# Patient Record
Sex: FEMALE | Race: WHITE | NOT HISPANIC OR LATINO | Employment: OTHER | URBAN - METROPOLITAN AREA
[De-identification: names, ages, dates, MRNs, and addresses within clinical notes are randomized per-mention and may not be internally consistent; named-entity substitution may affect disease eponyms.]

---

## 2017-09-08 ENCOUNTER — HOSPITAL ENCOUNTER (OUTPATIENT)
Dept: LAB | Facility: MEDICAL CENTER | Age: 62
End: 2017-09-08
Attending: PHYSICIAN ASSISTANT
Payer: COMMERCIAL

## 2017-09-08 PROCEDURE — 88175 CYTOPATH C/V AUTO FLUID REDO: CPT

## 2017-09-08 PROCEDURE — 87624 HPV HI-RISK TYP POOLED RSLT: CPT

## 2017-09-12 LAB
CYTOLOGY REG CYTOL: NORMAL
HPV HR 12 DNA CVX QL NAA+PROBE: NEGATIVE
HPV16 DNA SPEC QL NAA+PROBE: NEGATIVE
HPV18 DNA SPEC QL NAA+PROBE: NEGATIVE
SPECIMEN SOURCE: NORMAL

## 2017-10-06 ENCOUNTER — HOSPITAL ENCOUNTER (OUTPATIENT)
Dept: RADIOLOGY | Facility: MEDICAL CENTER | Age: 62
End: 2017-10-06

## 2017-10-12 ENCOUNTER — HOSPITAL ENCOUNTER (OUTPATIENT)
Dept: RADIOLOGY | Facility: MEDICAL CENTER | Age: 62
End: 2017-10-12
Attending: FAMILY MEDICINE
Payer: COMMERCIAL

## 2017-10-12 DIAGNOSIS — Z12.31 VISIT FOR SCREENING MAMMOGRAM: ICD-10-CM

## 2017-10-12 DIAGNOSIS — M81.0 SENILE OSTEOPOROSIS: ICD-10-CM

## 2017-10-12 PROCEDURE — 77080 DXA BONE DENSITY AXIAL: CPT

## 2017-10-12 PROCEDURE — G0202 SCR MAMMO BI INCL CAD: HCPCS

## 2017-12-04 ENCOUNTER — HOSPITAL ENCOUNTER (OUTPATIENT)
Dept: LAB | Facility: MEDICAL CENTER | Age: 62
End: 2017-12-04
Attending: SPECIALIST
Payer: COMMERCIAL

## 2017-12-04 PROCEDURE — 87086 URINE CULTURE/COLONY COUNT: CPT

## 2017-12-07 LAB
BACTERIA UR CULT: NORMAL
SIGNIFICANT IND 70042: NORMAL
SITE SITE: NORMAL
SOURCE SOURCE: NORMAL

## 2018-02-16 ENCOUNTER — HOSPITAL ENCOUNTER (OUTPATIENT)
Dept: LAB | Facility: MEDICAL CENTER | Age: 63
End: 2018-02-16
Attending: INTERNAL MEDICINE
Payer: COMMERCIAL

## 2018-02-16 LAB
ALBUMIN SERPL BCP-MCNC: 4.2 G/DL (ref 3.2–4.9)
ALBUMIN/GLOB SERPL: 1.6 G/DL
ALP SERPL-CCNC: 58 U/L (ref 30–99)
ALT SERPL-CCNC: 14 U/L (ref 2–50)
ANION GAP SERPL CALC-SCNC: 5 MMOL/L (ref 0–11.9)
AST SERPL-CCNC: 19 U/L (ref 12–45)
BASOPHILS # BLD AUTO: 0.9 % (ref 0–1.8)
BASOPHILS # BLD: 0.06 K/UL (ref 0–0.12)
BILIRUB SERPL-MCNC: 0.7 MG/DL (ref 0.1–1.5)
BUN SERPL-MCNC: 12 MG/DL (ref 8–22)
CALCIUM SERPL-MCNC: 8.8 MG/DL (ref 8.5–10.5)
CHLORIDE SERPL-SCNC: 110 MMOL/L (ref 96–112)
CO2 SERPL-SCNC: 23 MMOL/L (ref 20–33)
CREAT SERPL-MCNC: 0.8 MG/DL (ref 0.5–1.4)
EOSINOPHIL # BLD AUTO: 0.37 K/UL (ref 0–0.51)
EOSINOPHIL NFR BLD: 5.4 % (ref 0–6.9)
ERYTHROCYTE [DISTWIDTH] IN BLOOD BY AUTOMATED COUNT: 51.9 FL (ref 35.9–50)
GLOBULIN SER CALC-MCNC: 2.7 G/DL (ref 1.9–3.5)
GLUCOSE SERPL-MCNC: 86 MG/DL (ref 65–99)
HCT VFR BLD AUTO: 40 % (ref 37–47)
HGB BLD-MCNC: 12.3 G/DL (ref 12–16)
IMM GRANULOCYTES # BLD AUTO: 0.01 K/UL (ref 0–0.11)
IMM GRANULOCYTES NFR BLD AUTO: 0.1 % (ref 0–0.9)
LYMPHOCYTES # BLD AUTO: 1.77 K/UL (ref 1–4.8)
LYMPHOCYTES NFR BLD: 25.8 % (ref 22–41)
MCH RBC QN AUTO: 31.8 PG (ref 27–33)
MCHC RBC AUTO-ENTMCNC: 30.8 G/DL (ref 33.6–35)
MCV RBC AUTO: 103.4 FL (ref 81.4–97.8)
MONOCYTES # BLD AUTO: 0.69 K/UL (ref 0–0.85)
MONOCYTES NFR BLD AUTO: 10.1 % (ref 0–13.4)
NEUTROPHILS # BLD AUTO: 3.95 K/UL (ref 2–7.15)
NEUTROPHILS NFR BLD: 57.7 % (ref 44–72)
NRBC # BLD AUTO: 0 K/UL
NRBC BLD-RTO: 0 /100 WBC
PLATELET # BLD AUTO: 247 K/UL (ref 164–446)
PMV BLD AUTO: 10.2 FL (ref 9–12.9)
POTASSIUM SERPL-SCNC: 3.5 MMOL/L (ref 3.6–5.5)
PROT SERPL-MCNC: 6.9 G/DL (ref 6–8.2)
RBC # BLD AUTO: 3.87 M/UL (ref 4.2–5.4)
SODIUM SERPL-SCNC: 138 MMOL/L (ref 135–145)
WBC # BLD AUTO: 6.9 K/UL (ref 4.8–10.8)

## 2018-02-16 PROCEDURE — 80197 ASSAY OF TACROLIMUS: CPT

## 2018-02-16 PROCEDURE — 80053 COMPREHEN METABOLIC PANEL: CPT

## 2018-02-16 PROCEDURE — 36415 COLL VENOUS BLD VENIPUNCTURE: CPT

## 2018-02-16 PROCEDURE — 85025 COMPLETE CBC W/AUTO DIFF WBC: CPT

## 2018-02-20 LAB — TACROLIMUS BLD-MCNC: 3.4 NG/ML

## 2018-11-13 ENCOUNTER — HOSPITAL ENCOUNTER (OUTPATIENT)
Dept: LAB | Facility: MEDICAL CENTER | Age: 63
End: 2018-11-13
Attending: INTERNAL MEDICINE
Payer: COMMERCIAL

## 2018-11-13 LAB
ALBUMIN SERPL BCP-MCNC: 4.2 G/DL (ref 3.2–4.9)
ALBUMIN/GLOB SERPL: 1.6 G/DL
ALP SERPL-CCNC: 59 U/L (ref 30–99)
ALT SERPL-CCNC: 13 U/L (ref 2–50)
ANION GAP SERPL CALC-SCNC: 8 MMOL/L (ref 0–11.9)
AST SERPL-CCNC: 15 U/L (ref 12–45)
BASOPHILS # BLD AUTO: 0.6 % (ref 0–1.8)
BASOPHILS # BLD: 0.04 K/UL (ref 0–0.12)
BILIRUB SERPL-MCNC: 0.5 MG/DL (ref 0.1–1.5)
BUN SERPL-MCNC: 18 MG/DL (ref 8–22)
CALCIUM SERPL-MCNC: 9.2 MG/DL (ref 8.5–10.5)
CHLORIDE SERPL-SCNC: 111 MMOL/L (ref 96–112)
CO2 SERPL-SCNC: 22 MMOL/L (ref 20–33)
CREAT SERPL-MCNC: 0.83 MG/DL (ref 0.5–1.4)
EOSINOPHIL # BLD AUTO: 0.17 K/UL (ref 0–0.51)
EOSINOPHIL NFR BLD: 2.4 % (ref 0–6.9)
ERYTHROCYTE [DISTWIDTH] IN BLOOD BY AUTOMATED COUNT: 52.9 FL (ref 35.9–50)
GLOBULIN SER CALC-MCNC: 2.6 G/DL (ref 1.9–3.5)
GLUCOSE SERPL-MCNC: 82 MG/DL (ref 65–99)
HCT VFR BLD AUTO: 40.3 % (ref 37–47)
HGB BLD-MCNC: 13.1 G/DL (ref 12–16)
IMM GRANULOCYTES # BLD AUTO: 0.01 K/UL (ref 0–0.11)
IMM GRANULOCYTES NFR BLD AUTO: 0.1 % (ref 0–0.9)
LYMPHOCYTES # BLD AUTO: 1.5 K/UL (ref 1–4.8)
LYMPHOCYTES NFR BLD: 21.2 % (ref 22–41)
MCH RBC QN AUTO: 34.7 PG (ref 27–33)
MCHC RBC AUTO-ENTMCNC: 32.5 G/DL (ref 33.6–35)
MCV RBC AUTO: 106.6 FL (ref 81.4–97.8)
MONOCYTES # BLD AUTO: 0.69 K/UL (ref 0–0.85)
MONOCYTES NFR BLD AUTO: 9.7 % (ref 0–13.4)
NEUTROPHILS # BLD AUTO: 4.67 K/UL (ref 2–7.15)
NEUTROPHILS NFR BLD: 66 % (ref 44–72)
NRBC # BLD AUTO: 0 K/UL
NRBC BLD-RTO: 0 /100 WBC
PLATELET # BLD AUTO: 239 K/UL (ref 164–446)
PMV BLD AUTO: 10.5 FL (ref 9–12.9)
POTASSIUM SERPL-SCNC: 3.5 MMOL/L (ref 3.6–5.5)
PROT SERPL-MCNC: 6.8 G/DL (ref 6–8.2)
RBC # BLD AUTO: 3.78 M/UL (ref 4.2–5.4)
SODIUM SERPL-SCNC: 141 MMOL/L (ref 135–145)
WBC # BLD AUTO: 7.1 K/UL (ref 4.8–10.8)

## 2018-11-13 PROCEDURE — 80197 ASSAY OF TACROLIMUS: CPT

## 2018-11-13 PROCEDURE — 85025 COMPLETE CBC W/AUTO DIFF WBC: CPT

## 2018-11-13 PROCEDURE — 80053 COMPREHEN METABOLIC PANEL: CPT

## 2018-11-13 PROCEDURE — 36415 COLL VENOUS BLD VENIPUNCTURE: CPT

## 2018-11-15 LAB — TACROLIMUS BLD-MCNC: 6.4 NG/ML

## 2018-11-20 ENCOUNTER — HOSPITAL ENCOUNTER (OUTPATIENT)
Dept: LAB | Facility: MEDICAL CENTER | Age: 63
End: 2018-11-20
Attending: NURSE PRACTITIONER
Payer: COMMERCIAL

## 2018-11-20 PROCEDURE — 87624 HPV HI-RISK TYP POOLED RSLT: CPT

## 2018-11-20 PROCEDURE — 88175 CYTOPATH C/V AUTO FLUID REDO: CPT

## 2019-01-29 ENCOUNTER — HOSPITAL ENCOUNTER (OUTPATIENT)
Dept: RADIOLOGY | Facility: MEDICAL CENTER | Age: 64
End: 2019-01-29
Attending: FAMILY MEDICINE
Payer: COMMERCIAL

## 2019-01-29 DIAGNOSIS — Q07.00 ARNOLD-CHIARI SYNDROME (HCC): ICD-10-CM

## 2019-01-29 PROCEDURE — 70544 MR ANGIOGRAPHY HEAD W/O DYE: CPT

## 2019-01-29 PROCEDURE — 70551 MRI BRAIN STEM W/O DYE: CPT

## 2019-02-19 ENCOUNTER — HOSPITAL ENCOUNTER (OUTPATIENT)
Dept: RADIOLOGY | Facility: MEDICAL CENTER | Age: 64
End: 2019-02-19
Attending: SPECIALIST
Payer: COMMERCIAL

## 2019-02-19 DIAGNOSIS — Z12.31 SCREENING MAMMOGRAM, ENCOUNTER FOR: ICD-10-CM

## 2019-02-19 DIAGNOSIS — R92.30 DENSE BREAST TISSUE: ICD-10-CM

## 2019-02-19 PROCEDURE — 76641 ULTRASOUND BREAST COMPLETE: CPT

## 2019-02-19 PROCEDURE — 77063 BREAST TOMOSYNTHESIS BI: CPT

## 2019-03-01 NOTE — CONSULTS
Neuro Interventional Service Consultation      Re: Yane Mendoza     MRN: [id number]   : 1955    Yane Mendoza was initially referred to our service by Dhiraj La MD in  for elective intracranial aneurysm repair. She is a 63 y.o. female seen in clinic for evaluation and possible intervention for intracranial aneurysm recurrence. She is also under the care of Beatrice Mccarthy MD and Fred Mccain MD.     History of Present Illness:   is known to our practice for an elective repair of a right superior hypophyseal artery aneurysm in 2012. Due to insurance reasons, she was unable to follow up in our clinic and was never contacted for follow up scans after we placed orders within her network. She has a history of headaches and was found to have a Chiari I Malformation, for which she underwent surgical intervention in . Imaging showed an incidental 7 mm unruptured right internal carotid artery aneurysm and she elected surveillance of the stable aneurysm until , when she underwent an uncomplicated aneurysm coiling with Aditya Tim MD. She never had follow up and had the return of headaches outside the pattern of her usual headaches and an updated MRA shows a possible 3-4 mm recurrence at the base of the previously treated aneurysm. She is seen today for evaluation and possible repeat coiling of the recurrence. Today, she complains of chronic headaches as well as headaches in a similar pattern to before the aneurysm was treated. These more acute headaches went away after the coiling in  but have returned. She has some right eye blurriness and double vision associated with them. She has a referral to a specialist for intracranial hypertension that was treated with CSF removal in the past. Ms. Mendoza additionally has a history of a liver transplant in . She reports significant headaches after aneurysm repair in 2012 that were effectively treated with steroids. She denies a history  of bleeding with nosebleeds, melena, or hematemesis. She does not have restrictions with taking NSAIDs. She is accompanied by her  today.     She is seen today for review of imaging studies and discussion of possible embolization of aneurysm recurrence.     Past Medical History:   Diagnosis Date   • Anesthesia     ponv   • Cataracts, bilateral    • H/O liver transplant (HCC)    • Migraine headache    • Primary biliary cirrhosis (HCC)     s/p liver transplant 1999   • Right internal carotid artery aneurysm    • Snoring      Past Surgical History:   Procedure Laterality Date   • RECOVERY  6/4/2012    [facility]   • PB SUBOCCIPT DECOMP MEDULLA/SP CRD  9/4/2008    [place], [place]   • PB TRANSPLANT LIVER,ALLOTRANSPLANT  5/30/1999    [place]     Social History     Social History   • Marital status:      Spouse name: N/A   • Number of children: N/A   • Years of education: N/A     Occupational History   • Not on file.     Social History Main Topics   • Smoking status: Never Smoker   • Smokeless tobacco: Not on file   • Alcohol use No   • Drug use: No   • Sexual activity: Not on file     Other Topics Concern   • Not on file     Social History Narrative   • No narrative on file     No family history on file.    Review of Systems   HENT: Negative for nosebleeds.    Eyes: Positive for blurred vision and double vision.        Negative for icterus   Respiratory: Negative.  Negative for hemoptysis.    Cardiovascular: Negative.    Gastrointestinal: Negative for blood in stool and melena.   Genitourinary: Negative for hematuria.   Skin: Negative.    Neurological: Positive for headaches. Negative for dizziness, sensory change, speech change and focal weakness.   Endo/Heme/Allergies: Does not bruise/bleed easily.   Psychiatric/Behavioral: Negative for substance abuse.       A comprehensive 14-point review of systems was negative except as described above.      Labs:      Ref. Range 2/16/2018 10:01 11/13/2018 09:59   WBC Latest Ref Range: 4.8 - 10.8 K/uL 6.9 7.1   RBC Latest Ref Range: 4.20 - 5.40 M/uL 3.87 (L) 3.78 (L)   Hemoglobin Latest Ref Range: 12.0 - 16.0 g/dL 12.3 13.1   Hematocrit Latest Ref Range: 37.0 - 47.0 % 40.0 40.3   MCV Latest Ref Range: 81.4 - 97.8 fL 103.4 (H) 106.6 (H)   MCH Latest Ref Range: 27.0 - 33.0 pg 31.8 34.7 (H)   MCHC Latest Ref Range: 33.6 - 35.0 g/dL 30.8 (L) 32.5 (L)   RDW Latest Ref Range: 35.9 - 50.0 fL 51.9 (H) 52.9 (H)   Platelet Count Latest Ref Range: 164 - 446 K/uL 247 239   MPV Latest Ref Range: 9.0 - 12.9 fL 10.2 10.5   Neutrophils-Polys Latest Ref Range: 44.00 - 72.00 % 57.70 66.00   Neutrophils (Absolute) Latest Ref Range: 2.00 - 7.15 K/uL 3.95 4.67   Lymphocytes Latest Ref Range: 22.00 - 41.00 % 25.80 21.20 (L)   Lymphs (Absolute) Latest Ref Range: 1.00 - 4.80 K/uL 1.77 1.50   Monocytes Latest Ref Range: 0.00 - 13.40 % 10.10 9.70   Monos (Absolute) Latest Ref Range: 0.00 - 0.85 K/uL 0.69 0.69   Eosinophils Latest Ref Range: 0.00 - 6.90 % 5.40 2.40   Eos (Absolute) Latest Ref Range: 0.00 - 0.51 K/uL 0.37 0.17   Basophils Latest Ref Range: 0.00 - 1.80 % 0.90 0.60   Baso (Absolute) Latest Ref Range: 0.00 - 0.12 K/uL 0.06 0.04   Immature Granulocytes Latest Ref Range: 0.00 - 0.90 % 0.10 0.10   Immature Granulocytes (abs) Latest Ref Range: 0.00 - 0.11 K/uL 0.01 0.01   Nucleated RBC Latest Units: /100 WBC 0.00 0.00   NRBC (Absolute) Latest Units: K/uL 0.00 0.00   Sodium Latest Ref Range: 135 - 145 mmol/L 138 141   Potassium Latest Ref Range: 3.6 - 5.5 mmol/L 3.5 (L) 3.5 (L)   Chloride Latest Ref Range: 96 - 112 mmol/L 110 111   Co2 Latest Ref Range: 20 - 33 mmol/L 23 22   Anion Gap Latest Ref Range: 0.0 - 11.9  5.0 8.0   Glucose Latest Ref Range: 65 - 99 mg/dL 86 82   Bun Latest Ref Range: 8 - 22 mg/dL 12 18   Creatinine Latest Ref Range: 0.50 - 1.40 mg/dL 0.80 0.83   GFR If  Latest Ref Range: >60 mL/min/1.73 m 2  >60 >60   GFR If Non  Latest Ref Range: >60 mL/min/1.73 m 2 >60 >60   Calcium Latest Ref Range: 8.5 - 10.5 mg/dL 8.8 9.2   AST(SGOT) Latest Ref Range: 12 - 45 U/L 19 15   ALT(SGPT) Latest Ref Range: 2 - 50 U/L 14 13   Alkaline Phosphatase Latest Ref Range: 30 - 99 U/L 58 59   Total Bilirubin Latest Ref Range: 0.1 - 1.5 mg/dL 0.7 0.5   Albumin Latest Ref Range: 3.2 - 4.9 g/dL 4.2 4.2   Total Protein Latest Ref Range: 6.0 - 8.2 g/dL 6.9 6.8   Globulin Latest Ref Range: 1.9 - 3.5 g/dL 2.7 2.6   A-G Ratio Latest Units: g/dL 1.6 1.6   Tacrolimius Latest Units: ng/mL 3.4 6.4     Radiology:   MRA head on January 29, 2019 at [facility]:  1.  Normal variant dominant distal right vertebral artery. Hypoplastic left distal vertebral artery V4 segment.  2.  Coil pack identified at the site of right superior hypophyseal aneurysm coiling with 3-3.5 mm residual or recurrent aneurysm.  3.  Neuro interventional consultation for possible reevaluation or retreatment would be recommended.      MRI brain January 29, 2019 at [facility]:  1.  Concerning the given history of Chiari I malformation, there appears to be postoperative change with suboccipital decompression.  2.  Status post coiling of right superior hypophyseal aneurysm. The coil pack is not identified on the MRI but is well seen on CT from 6/5/2012.  3.  Remainder of the study is unremarkable.    Neurointerventional procedure June 4, 2012 at [facility]:  Endovascular repair of right superior hypophyseal aneurysm.      Current Outpatient Prescriptions   Medication Sig Dispense Refill   • methylPREDNISolone (MEDROL DOSPACK) 4 MG TABS Take 4 mg by mouth every day. follow package directions   Indications: Inflammation     • omeprazole (PRILOSEC) 20 MG CPDR Take 20 mg by mouth every day.     • sucralfate (CARAFATE) 1 GM TABS Take 1 g by mouth 4 times a day.     • estradiol (VIVELLE DOT) 0.05 MG/24HR PTTW Apply 1 Patch to skin as directed 2 times a week. Use one patch two times  per week.      • acetaZOLAMIDE (DIAMOX) 250 MG TABS Take 250 mg by mouth 2 times a day.     • hydrocodone/acetaminophen (NORCO)  MG TABS Take 1-2 Tabs by mouth every 6 hours as needed.     • Calcium Carbonate-Vitamin D (CALCIUM + D PO) Take  by mouth 2 Times a Day.     • Methylcellulose, Laxative, (CITRUCEL) 500 MG TABS Take  by mouth 2 Times a Day.     • mycophenolate (CELLCEPT) 250 MG CAPS Take 750 mg by mouth 2 times a day. Indications: Body's Rejection of a Transplanted Organ     • tacrolimus (PROGRAF) 1 MG CAPS Take 1 mg by mouth 2 times a day. Indications: Body's Rejection of a Transplanted Organ     • zolpidem (AMBIEN) 5 MG TABS Take 10 mg by mouth at bedtime as needed.     • progesterone (PROMETRIUM) 100 MG CAPS Take 100 mg by mouth every day.     • sumatriptan (IMITREX) 50 MG TABS Take 100 mg by mouth Once PRN.       No current facility-administered medications for this encounter.        Allergies   Allergen Reactions   • Codeine Vomiting       Physical Exam   Constitutional: She is oriented to person, place, and time and well-developed, well-nourished, and in no distress. No distress.   HENT:   Head: Normocephalic.   Eyes: Pupils are equal, round, and reactive to light. No scleral icterus.   Cardiovascular: Normal rate, regular rhythm and normal heart sounds.    Pulmonary/Chest: Effort normal. No respiratory distress. She has decreased breath sounds in the right lower field and the left lower field. She has no wheezes. She has no rales.   Abdominal: Soft. Bowel sounds are normal. She exhibits no distension.   No ascites noted   Neurological: She is alert and oriented to person, place, and time. She has normal sensation and normal strength. She is not agitated and not disoriented. She displays no weakness, no tremor, facial symmetry, normal stance and normal speech. No cranial nerve deficit. Gait normal. Coordination and gait normal.   Skin: Skin is warm and dry. No rash noted. She is not diaphoretic.  No erythema. No pallor.   Psychiatric: Mood, memory, affect and judgment normal.     Impression:        1. Unruptured right superior hypophyseal artery aneurysm, 7 mm in size, status post coil embolization, with recurrence.        2. Primary biliary cirrhosis, status post liver transplant in 1999.        3. Migraine headaches.        4. Chiari I Malformation, status post decompression in 2008.      Plan:   Aditya Tim MD has reviewed 's history and imaging studies, examined the patient, and discussed treatment options.  is a candidate for aneurysm repair. We discussed the method of the procedure at length including the possibility of the use of stents, balloons, and Flow Diverters to facilitate aneurysm repair and associated risks of those devices. We additionally discussed the procedure risks, including bleeding and infection, damage to the arteries, reaction to any medications given during the procedure, side effects of contrast, radiation exposure, in stent stenosis, coil or stent migration, mechanical failure, stroke, hemorrhage, and death. There is a chance the aneurysm may ultimately not be amenable to endovascular intervention. After embolization, there is a chance that the aneurysm could recur. We discussed alternatives of the procedure including surveillance. We have no interval imaging since 2012 and it is unknown if the recurrence is stable since that time or a newer finding. We offered a 6 month surveillance prior to intervention. The patient verbalizes understanding of the plan and elects to proceed. Ms. Mendoza is eager to proceed with intervention and we offered a diagnostic workup prior to the coiling procedure, however she wishes to minimize the number of invasive procedures and would like to move forward with aneurysm repair. Our plan will likely include placement of a flow diverter device. Written pre- and post- procedure care instructions were provided. We discussed the  need for aspirin and Plavix prior to the coiling procedure and for 6 months post procedure if a stent or flow diverter is placed. Side effects of these medications were discussed. We will contact her to schedule.       MARTA Ramsey with Aditya Tim MD  Neuro Interventional Service   [address] ([id number])  [place] [address]  [phone]

## 2019-03-04 ENCOUNTER — HOSPITAL ENCOUNTER (OUTPATIENT)
Dept: RADIOLOGY | Facility: MEDICAL CENTER | Age: 64
End: 2019-03-04
Attending: PHYSICIAN ASSISTANT

## 2019-03-04 DIAGNOSIS — I67.1 INTRACRANIAL ANEURYSM: ICD-10-CM

## 2019-03-04 ASSESSMENT — ENCOUNTER SYMPTOMS
SENSORY CHANGE: 0
HEMOPTYSIS: 0
DIZZINESS: 0
DOUBLE VISION: 1
FOCAL WEAKNESS: 0
HEADACHES: 1
BRUISES/BLEEDS EASILY: 0
CARDIOVASCULAR NEGATIVE: 1
BLURRED VISION: 1
SPEECH CHANGE: 0
RESPIRATORY NEGATIVE: 1
BLOOD IN STOOL: 0

## 2019-03-04 ASSESSMENT — LIFESTYLE VARIABLES: SUBSTANCE_ABUSE: 0

## 2019-03-08 NOTE — PROGRESS NOTES
Pt to be scheduled for aneurysm repair with plan for Pipeline Flow Diverter placement. Plavix called in to [facility]. [place]: 75 mg po daily #30 x 5 additional RF; alternatively 75 mg po daily #180 x 1 additional refill. Pt to take with daily ASA 81 mg. Instructed by scheduling staff to start meds right away and preadmit 3/13 for TEG and platelet mapping. Will adjust medications if needed for platelet inhibition prior to procedure 3/19.

## 2019-03-15 DIAGNOSIS — Z01.812 PRE-PROCEDURAL LABORATORY EXAMINATION: ICD-10-CM

## 2019-03-15 LAB
ALBUMIN SERPL BCP-MCNC: 4.5 G/DL (ref 3.2–4.9)
ALBUMIN/GLOB SERPL: 1.6 G/DL
ALP SERPL-CCNC: 55 U/L (ref 30–99)
ALT SERPL-CCNC: 16 U/L (ref 2–50)
ANION GAP SERPL CALC-SCNC: 8 MMOL/L (ref 0–11.9)
AST SERPL-CCNC: 16 U/L (ref 12–45)
BILIRUB SERPL-MCNC: 0.6 MG/DL (ref 0.1–1.5)
BUN SERPL-MCNC: 16 MG/DL (ref 8–22)
CALCIUM SERPL-MCNC: 9 MG/DL (ref 8.5–10.5)
CFT BLD TEG: 4.6 MIN (ref 5–10)
CHLORIDE SERPL-SCNC: 109 MMOL/L (ref 96–112)
CLOT ANGLE BLD TEG: 63.3 DEGREES (ref 53–72)
CLOT LYSIS 30M P MA LENFR BLD TEG: 0.3 % (ref 0–8)
CO2 SERPL-SCNC: 23 MMOL/L (ref 20–33)
CREAT SERPL-MCNC: 0.91 MG/DL (ref 0.5–1.4)
CT.EXTRINSIC BLD ROTEM: 2 MIN (ref 1–3)
ERYTHROCYTE [DISTWIDTH] IN BLOOD BY AUTOMATED COUNT: 51.3 FL (ref 35.9–50)
GLOBULIN SER CALC-MCNC: 2.9 G/DL (ref 1.9–3.5)
GLUCOSE SERPL-MCNC: 107 MG/DL (ref 65–99)
HCT VFR BLD AUTO: 42.5 % (ref 37–47)
HGB BLD-MCNC: 13.2 G/DL (ref 12–16)
INR PPP: 1.04 (ref 0.87–1.13)
MCF BLD TEG: 60.4 MM (ref 50–70)
MCH RBC QN AUTO: 33.3 PG (ref 27–33)
MCHC RBC AUTO-ENTMCNC: 31.1 G/DL (ref 33.6–35)
MCV RBC AUTO: 107.3 FL (ref 81.4–97.8)
PA AA BLD-ACNC: 94.5 %
PA ADP BLD-ACNC: 30.4 %
PLATELET # BLD AUTO: 242 K/UL (ref 164–446)
PMV BLD AUTO: 10.3 FL (ref 9–12.9)
POTASSIUM SERPL-SCNC: 3.7 MMOL/L (ref 3.6–5.5)
PROT SERPL-MCNC: 7.4 G/DL (ref 6–8.2)
PROTHROMBIN TIME: 13.8 SEC (ref 12–14.6)
RBC # BLD AUTO: 3.96 M/UL (ref 4.2–5.4)
SODIUM SERPL-SCNC: 140 MMOL/L (ref 135–145)
TEG ALGORITHM TGALG: ABNORMAL
WBC # BLD AUTO: 6.7 K/UL (ref 4.8–10.8)

## 2019-03-15 PROCEDURE — 85027 COMPLETE CBC AUTOMATED: CPT

## 2019-03-15 PROCEDURE — 36415 COLL VENOUS BLD VENIPUNCTURE: CPT

## 2019-03-15 PROCEDURE — 85384 FIBRINOGEN ACTIVITY: CPT

## 2019-03-15 PROCEDURE — 85347 COAGULATION TIME ACTIVATED: CPT

## 2019-03-15 PROCEDURE — 80053 COMPREHEN METABOLIC PANEL: CPT

## 2019-03-15 PROCEDURE — 85576 BLOOD PLATELET AGGREGATION: CPT | Mod: 91

## 2019-03-15 PROCEDURE — 85610 PROTHROMBIN TIME: CPT

## 2019-03-15 RX ORDER — ACYCLOVIR 800 MG/1
800 TABLET ORAL 4 TIMES DAILY
COMMUNITY

## 2019-03-15 RX ORDER — ZOLPIDEM TARTRATE 10 MG/1
15-20 TABLET ORAL NIGHTLY PRN
COMMUNITY

## 2019-03-15 RX ORDER — CLOPIDOGREL BISULFATE 75 MG/1
75 TABLET ORAL DAILY
COMMUNITY

## 2019-03-15 RX ORDER — PANTOPRAZOLE SODIUM 40 MG/1
40 TABLET, DELAYED RELEASE ORAL DAILY
COMMUNITY

## 2019-03-19 ENCOUNTER — HOSPITAL ENCOUNTER (INPATIENT)
Facility: MEDICAL CENTER | Age: 64
LOS: 1 days | DRG: 026 | End: 2019-03-20
Attending: RADIOLOGY | Admitting: RADIOLOGY
Payer: COMMERCIAL

## 2019-03-19 ENCOUNTER — ANESTHESIA EVENT (OUTPATIENT)
Dept: RADIOLOGY | Facility: MEDICAL CENTER | Age: 64
DRG: 026 | End: 2019-03-19
Payer: COMMERCIAL

## 2019-03-19 ENCOUNTER — ANESTHESIA (OUTPATIENT)
Dept: RADIOLOGY | Facility: MEDICAL CENTER | Age: 64
DRG: 026 | End: 2019-03-19
Payer: COMMERCIAL

## 2019-03-19 ENCOUNTER — APPOINTMENT (OUTPATIENT)
Dept: RADIOLOGY | Facility: MEDICAL CENTER | Age: 64
DRG: 026 | End: 2019-03-19
Attending: RADIOLOGY
Payer: COMMERCIAL

## 2019-03-19 DIAGNOSIS — I67.1 INTRACRANIAL ANEURYSM: ICD-10-CM

## 2019-03-19 PROBLEM — R11.2 PONV (POSTOPERATIVE NAUSEA AND VOMITING): Status: ACTIVE | Noted: 2019-03-19

## 2019-03-19 PROBLEM — K74.3 PRIMARY BILIARY CIRRHOSIS (HCC): Status: ACTIVE | Noted: 2019-03-19

## 2019-03-19 PROBLEM — Z98.890 PONV (POSTOPERATIVE NAUSEA AND VOMITING): Status: ACTIVE | Noted: 2019-03-19

## 2019-03-19 PROBLEM — Z94.4 H/O LIVER TRANSPLANT (HCC): Status: ACTIVE | Noted: 2019-03-19

## 2019-03-19 PROCEDURE — 99223 1ST HOSP IP/OBS HIGH 75: CPT | Performed by: HOSPITALIST

## 2019-03-19 PROCEDURE — 700102 HCHG RX REV CODE 250 W/ 637 OVERRIDE(OP): Performed by: RADIOLOGY

## 2019-03-19 PROCEDURE — A9270 NON-COVERED ITEM OR SERVICE: HCPCS

## 2019-03-19 PROCEDURE — 700111 HCHG RX REV CODE 636 W/ 250 OVERRIDE (IP): Performed by: ANESTHESIOLOGY

## 2019-03-19 PROCEDURE — 700105 HCHG RX REV CODE 258: Performed by: ANESTHESIOLOGY

## 2019-03-19 PROCEDURE — 700101 HCHG RX REV CODE 250: Performed by: ANESTHESIOLOGY

## 2019-03-19 PROCEDURE — A9270 NON-COVERED ITEM OR SERVICE: HCPCS | Performed by: RADIOLOGY

## 2019-03-19 PROCEDURE — 700102 HCHG RX REV CODE 250 W/ 637 OVERRIDE(OP)

## 2019-03-19 PROCEDURE — 770022 HCHG ROOM/CARE - ICU (200)

## 2019-03-19 PROCEDURE — 03VG3DZ RESTRICTION OF INTRACRANIAL ARTERY WITH INTRALUMINAL DEVICE, PERCUTANEOUS APPROACH: ICD-10-PCS | Performed by: RADIOLOGY

## 2019-03-19 PROCEDURE — A9270 NON-COVERED ITEM OR SERVICE: HCPCS | Performed by: HOSPITALIST

## 2019-03-19 PROCEDURE — 700102 HCHG RX REV CODE 250 W/ 637 OVERRIDE(OP): Performed by: HOSPITALIST

## 2019-03-19 PROCEDURE — 700101 HCHG RX REV CODE 250

## 2019-03-19 PROCEDURE — 700105 HCHG RX REV CODE 258: Performed by: HOSPITALIST

## 2019-03-19 PROCEDURE — 160002 HCHG RECOVERY MINUTES (STAT)

## 2019-03-19 PROCEDURE — 01926 ANES IVNTL RAD ICR ICAR/AORT: CPT

## 2019-03-19 PROCEDURE — 700102 HCHG RX REV CODE 250 W/ 637 OVERRIDE(OP): Performed by: ANESTHESIOLOGY

## 2019-03-19 PROCEDURE — 61624 TCAT PERM OCCLS/EMBOLJ CNS: CPT

## 2019-03-19 PROCEDURE — 700111 HCHG RX REV CODE 636 W/ 250 OVERRIDE (IP)

## 2019-03-19 PROCEDURE — 700117 HCHG RX CONTRAST REV CODE 255: Performed by: RADIOLOGY

## 2019-03-19 PROCEDURE — A9270 NON-COVERED ITEM OR SERVICE: HCPCS | Performed by: ANESTHESIOLOGY

## 2019-03-19 PROCEDURE — B3131ZZ FLUOROSCOPY OF RIGHT COMMON CAROTID ARTERY USING LOW OSMOLAR CONTRAST: ICD-10-PCS | Performed by: RADIOLOGY

## 2019-03-19 PROCEDURE — 700111 HCHG RX REV CODE 636 W/ 250 OVERRIDE (IP): Performed by: HOSPITALIST

## 2019-03-19 RX ORDER — HEPARIN SODIUM,PORCINE 1000/ML
VIAL (ML) INJECTION
Status: COMPLETED
Start: 2019-03-19 | End: 2019-03-19

## 2019-03-19 RX ORDER — SODIUM CHLORIDE 9 MG/ML
INJECTION, SOLUTION INTRAVENOUS CONTINUOUS
Status: DISCONTINUED | OUTPATIENT
Start: 2019-03-19 | End: 2019-03-20 | Stop reason: HOSPADM

## 2019-03-19 RX ORDER — BISACODYL 10 MG
10 SUPPOSITORY, RECTAL RECTAL
Status: DISCONTINUED | OUTPATIENT
Start: 2019-03-19 | End: 2019-03-20 | Stop reason: HOSPADM

## 2019-03-19 RX ORDER — TACROLIMUS 1 MG/1
1 CAPSULE ORAL 2 TIMES DAILY
Status: DISCONTINUED | OUTPATIENT
Start: 2019-03-19 | End: 2019-03-20 | Stop reason: HOSPADM

## 2019-03-19 RX ORDER — HYDROCODONE BITARTRATE AND ACETAMINOPHEN 10; 325 MG/1; MG/1
0.5 TABLET ORAL EVERY 6 HOURS PRN
Status: DISCONTINUED | OUTPATIENT
Start: 2019-03-19 | End: 2019-03-20

## 2019-03-19 RX ORDER — AMOXICILLIN 250 MG
2 CAPSULE ORAL 2 TIMES DAILY
Status: DISCONTINUED | OUTPATIENT
Start: 2019-03-19 | End: 2019-03-20 | Stop reason: HOSPADM

## 2019-03-19 RX ORDER — ACYCLOVIR 800 MG/1
800 TABLET ORAL 4 TIMES DAILY
Status: DISCONTINUED | OUTPATIENT
Start: 2019-03-19 | End: 2019-03-20 | Stop reason: HOSPADM

## 2019-03-19 RX ORDER — LIDOCAINE HYDROCHLORIDE 10 MG/ML
INJECTION, SOLUTION INFILTRATION; PERINEURAL
Status: COMPLETED
Start: 2019-03-19 | End: 2019-03-19

## 2019-03-19 RX ORDER — ACETAMINOPHEN 325 MG/1
650 TABLET ORAL EVERY 6 HOURS PRN
Status: DISCONTINUED | OUTPATIENT
Start: 2019-03-19 | End: 2019-03-20 | Stop reason: HOSPADM

## 2019-03-19 RX ORDER — MIDAZOLAM HYDROCHLORIDE 1 MG/ML
INJECTION INTRAMUSCULAR; INTRAVENOUS
Status: COMPLETED
Start: 2019-03-19 | End: 2019-03-19

## 2019-03-19 RX ORDER — ACETAZOLAMIDE 250 MG/1
250 TABLET ORAL 2 TIMES DAILY
Status: DISCONTINUED | OUTPATIENT
Start: 2019-03-19 | End: 2019-03-20 | Stop reason: HOSPADM

## 2019-03-19 RX ORDER — SUCRALFATE 1 G/1
1 TABLET ORAL 4 TIMES DAILY
Status: DISCONTINUED | OUTPATIENT
Start: 2019-03-19 | End: 2019-03-20 | Stop reason: HOSPADM

## 2019-03-19 RX ORDER — ZOLPIDEM TARTRATE 5 MG/1
5 TABLET ORAL NIGHTLY PRN
Status: DISCONTINUED | OUTPATIENT
Start: 2019-03-19 | End: 2019-03-20 | Stop reason: HOSPADM

## 2019-03-19 RX ORDER — ONDANSETRON 2 MG/ML
4 INJECTION INTRAMUSCULAR; INTRAVENOUS
Status: DISCONTINUED | OUTPATIENT
Start: 2019-03-19 | End: 2019-03-19 | Stop reason: HOSPADM

## 2019-03-19 RX ORDER — OMEPRAZOLE 20 MG/1
20 CAPSULE, DELAYED RELEASE ORAL DAILY
Status: DISCONTINUED | OUTPATIENT
Start: 2019-03-20 | End: 2019-03-20 | Stop reason: HOSPADM

## 2019-03-19 RX ORDER — DIPHENHYDRAMINE HYDROCHLORIDE 50 MG/ML
12.5 INJECTION INTRAMUSCULAR; INTRAVENOUS
Status: DISCONTINUED | OUTPATIENT
Start: 2019-03-19 | End: 2019-03-19 | Stop reason: HOSPADM

## 2019-03-19 RX ORDER — HALOPERIDOL 5 MG/ML
1 INJECTION INTRAMUSCULAR
Status: DISCONTINUED | OUTPATIENT
Start: 2019-03-19 | End: 2019-03-19 | Stop reason: HOSPADM

## 2019-03-19 RX ORDER — SODIUM CHLORIDE, SODIUM LACTATE, POTASSIUM CHLORIDE, CALCIUM CHLORIDE 600; 310; 30; 20 MG/100ML; MG/100ML; MG/100ML; MG/100ML
INJECTION, SOLUTION INTRAVENOUS
Status: DISCONTINUED | OUTPATIENT
Start: 2019-03-19 | End: 2019-03-19 | Stop reason: SURG

## 2019-03-19 RX ORDER — LIDOCAINE HYDROCHLORIDE 40 MG/ML
SOLUTION TOPICAL
Status: DISPENSED
Start: 2019-03-19 | End: 2019-03-20

## 2019-03-19 RX ORDER — CLOPIDOGREL BISULFATE 75 MG/1
75 TABLET ORAL DAILY
Status: DISCONTINUED | OUTPATIENT
Start: 2019-03-19 | End: 2019-03-20 | Stop reason: HOSPADM

## 2019-03-19 RX ORDER — OXYCODONE HCL 5 MG/5 ML
5 SOLUTION, ORAL ORAL
Status: COMPLETED | OUTPATIENT
Start: 2019-03-19 | End: 2019-03-19

## 2019-03-19 RX ORDER — POLYETHYLENE GLYCOL 3350 17 G/17G
1 POWDER, FOR SOLUTION ORAL
Status: DISCONTINUED | OUTPATIENT
Start: 2019-03-19 | End: 2019-03-20 | Stop reason: HOSPADM

## 2019-03-19 RX ORDER — SODIUM CHLORIDE, SODIUM LACTATE, POTASSIUM CHLORIDE, CALCIUM CHLORIDE 600; 310; 30; 20 MG/100ML; MG/100ML; MG/100ML; MG/100ML
INJECTION, SOLUTION INTRAVENOUS ONCE
Status: COMPLETED | OUTPATIENT
Start: 2019-03-19 | End: 2019-03-20

## 2019-03-19 RX ORDER — DEXAMETHASONE SODIUM PHOSPHATE 4 MG/ML
INJECTION, SOLUTION INTRA-ARTICULAR; INTRALESIONAL; INTRAMUSCULAR; INTRAVENOUS; SOFT TISSUE PRN
Status: DISCONTINUED | OUTPATIENT
Start: 2019-03-19 | End: 2019-03-19 | Stop reason: SURG

## 2019-03-19 RX ORDER — ONDANSETRON 2 MG/ML
4 INJECTION INTRAMUSCULAR; INTRAVENOUS EVERY 4 HOURS PRN
Status: DISCONTINUED | OUTPATIENT
Start: 2019-03-19 | End: 2019-03-20 | Stop reason: HOSPADM

## 2019-03-19 RX ORDER — MYCOPHENOLATE MOFETIL 250 MG/1
750 CAPSULE ORAL 2 TIMES DAILY
Status: DISCONTINUED | OUTPATIENT
Start: 2019-03-19 | End: 2019-03-20 | Stop reason: HOSPADM

## 2019-03-19 RX ORDER — HEPARIN SODIUM,PORCINE 1000/ML
VIAL (ML) INJECTION PRN
Status: DISCONTINUED | OUTPATIENT
Start: 2019-03-19 | End: 2019-03-19 | Stop reason: SURG

## 2019-03-19 RX ORDER — CEFAZOLIN SODIUM 1 G/3ML
INJECTION, POWDER, FOR SOLUTION INTRAMUSCULAR; INTRAVENOUS PRN
Status: DISCONTINUED | OUTPATIENT
Start: 2019-03-19 | End: 2019-03-19 | Stop reason: SURG

## 2019-03-19 RX ORDER — SODIUM CHLORIDE, SODIUM LACTATE, POTASSIUM CHLORIDE, CALCIUM CHLORIDE 600; 310; 30; 20 MG/100ML; MG/100ML; MG/100ML; MG/100ML
INJECTION, SOLUTION INTRAVENOUS CONTINUOUS
Status: DISCONTINUED | OUTPATIENT
Start: 2019-03-19 | End: 2019-03-19 | Stop reason: HOSPADM

## 2019-03-19 RX ORDER — PROMETHAZINE HYDROCHLORIDE 12.5 MG/1
12.5-25 SUPPOSITORY RECTAL EVERY 4 HOURS PRN
Status: DISCONTINUED | OUTPATIENT
Start: 2019-03-19 | End: 2019-03-20 | Stop reason: HOSPADM

## 2019-03-19 RX ORDER — PROMETHAZINE HYDROCHLORIDE 25 MG/1
12.5-25 TABLET ORAL EVERY 4 HOURS PRN
Status: DISCONTINUED | OUTPATIENT
Start: 2019-03-19 | End: 2019-03-20 | Stop reason: HOSPADM

## 2019-03-19 RX ORDER — CLOPIDOGREL BISULFATE 75 MG/1
75 TABLET ORAL DAILY
Status: DISCONTINUED | OUTPATIENT
Start: 2019-03-20 | End: 2019-03-19

## 2019-03-19 RX ORDER — TERBINAFINE HYDROCHLORIDE 250 MG/1
1 TABLET ORAL SEE ADMIN INSTRUCTIONS
Refills: 2 | COMMUNITY
Start: 2019-02-18

## 2019-03-19 RX ORDER — PANTOPRAZOLE SODIUM 40 MG/1
40 TABLET, DELAYED RELEASE ORAL DAILY
Status: DISCONTINUED | OUTPATIENT
Start: 2019-03-20 | End: 2019-03-19

## 2019-03-19 RX ORDER — MEPERIDINE HYDROCHLORIDE 25 MG/ML
6.25 INJECTION INTRAMUSCULAR; INTRAVENOUS; SUBCUTANEOUS
Status: DISCONTINUED | OUTPATIENT
Start: 2019-03-19 | End: 2019-03-19 | Stop reason: HOSPADM

## 2019-03-19 RX ORDER — ONDANSETRON 4 MG/1
4 TABLET, ORALLY DISINTEGRATING ORAL EVERY 4 HOURS PRN
Status: DISCONTINUED | OUTPATIENT
Start: 2019-03-19 | End: 2019-03-20 | Stop reason: HOSPADM

## 2019-03-19 RX ADMIN — HYDROCODONE BITARTRATE AND ACETAMINOPHEN 0.5 TABLET: 10; 325 TABLET ORAL at 21:29

## 2019-03-19 RX ADMIN — SODIUM CHLORIDE, POTASSIUM CHLORIDE, SODIUM LACTATE AND CALCIUM CHLORIDE: 600; 310; 30; 20 INJECTION, SOLUTION INTRAVENOUS at 16:35

## 2019-03-19 RX ADMIN — EPHEDRINE SULFATE 10 MG: 50 INJECTION INTRAMUSCULAR; INTRAVENOUS; SUBCUTANEOUS at 16:05

## 2019-03-19 RX ADMIN — CLOPIDOGREL BISULFATE 75 MG: 75 TABLET ORAL at 20:18

## 2019-03-19 RX ADMIN — ZOLPIDEM TARTRATE 5 MG: 5 TABLET ORAL at 22:18

## 2019-03-19 RX ADMIN — FENTANYL CITRATE 25 MCG: 50 INJECTION, SOLUTION INTRAMUSCULAR; INTRAVENOUS at 17:37

## 2019-03-19 RX ADMIN — ROCURONIUM BROMIDE 40 MG: 10 INJECTION, SOLUTION INTRAVENOUS at 15:30

## 2019-03-19 RX ADMIN — SODIUM CHLORIDE, POTASSIUM CHLORIDE, SODIUM LACTATE AND CALCIUM CHLORIDE: 600; 310; 30; 20 INJECTION, SOLUTION INTRAVENOUS at 15:20

## 2019-03-19 RX ADMIN — TACROLIMUS 1 MG: 1 CAPSULE ORAL at 22:00

## 2019-03-19 RX ADMIN — FENTANYL CITRATE 25 MCG: 50 INJECTION, SOLUTION INTRAMUSCULAR; INTRAVENOUS at 17:47

## 2019-03-19 RX ADMIN — FENTANYL CITRATE 50 MCG: 50 INJECTION, SOLUTION INTRAMUSCULAR; INTRAVENOUS at 19:13

## 2019-03-19 RX ADMIN — SODIUM CHLORIDE: 9 INJECTION, SOLUTION INTRAVENOUS at 22:03

## 2019-03-19 RX ADMIN — Medication 15 ML: at 18:07

## 2019-03-19 RX ADMIN — SENNOSIDES AND DOCUSATE SODIUM 2 TABLET: 8.6; 5 TABLET ORAL at 22:03

## 2019-03-19 RX ADMIN — SODIUM CHLORIDE, SODIUM LACTATE, POTASSIUM CHLORIDE, CALCIUM CHLORIDE: 600; 310; 30; 20 INJECTION, SOLUTION INTRAVENOUS at 18:07

## 2019-03-19 RX ADMIN — ACYCLOVIR 800 MG: 800 TABLET ORAL at 21:56

## 2019-03-19 RX ADMIN — PROGESTERONE 100 MG: 100 CAPSULE ORAL at 21:59

## 2019-03-19 RX ADMIN — PROPOFOL 120 MG: 10 INJECTION, EMULSION INTRAVENOUS at 15:30

## 2019-03-19 RX ADMIN — FENTANYL CITRATE 50 MCG: 50 INJECTION, SOLUTION INTRAMUSCULAR; INTRAVENOUS at 17:55

## 2019-03-19 RX ADMIN — HYDROCODONE BITARTRATE AND ACETAMINOPHEN 15 ML: 2.5; 108 SOLUTION ORAL at 18:07

## 2019-03-19 RX ADMIN — LIDOCAINE HYDROCHLORIDE 0.5 ML: 10 INJECTION, SOLUTION INFILTRATION; PERINEURAL at 10:05

## 2019-03-19 RX ADMIN — SUCRALFATE 1 G: 1 TABLET ORAL at 22:00

## 2019-03-19 RX ADMIN — HEPARIN SODIUM 1000 UNITS: 1000 INJECTION, SOLUTION INTRAVENOUS; SUBCUTANEOUS at 16:15

## 2019-03-19 RX ADMIN — IOHEXOL 100 ML: 300 INJECTION, SOLUTION INTRAVENOUS at 11:37

## 2019-03-19 RX ADMIN — HEPARIN SODIUM 5000 UNITS: 1000 INJECTION, SOLUTION INTRAVENOUS; SUBCUTANEOUS at 15:53

## 2019-03-19 RX ADMIN — MIDAZOLAM HYDROCHLORIDE 2 MG: 1 INJECTION, SOLUTION INTRAMUSCULAR; INTRAVENOUS at 15:23

## 2019-03-19 RX ADMIN — ASPIRIN 81 MG: 81 TABLET, COATED ORAL at 20:18

## 2019-03-19 RX ADMIN — VITAMIN D, TAB 1000IU (100/BT) 1000 UNITS: 25 TAB at 22:03

## 2019-03-19 RX ADMIN — EPHEDRINE SULFATE 5 MG: 50 INJECTION INTRAMUSCULAR; INTRAVENOUS; SUBCUTANEOUS at 15:55

## 2019-03-19 RX ADMIN — SODIUM CHLORIDE, SODIUM LACTATE, POTASSIUM CHLORIDE, CALCIUM CHLORIDE: 600; 310; 30; 20 INJECTION, SOLUTION INTRAVENOUS at 10:05

## 2019-03-19 RX ADMIN — MYCOPHENOLATE MOFETIL 750 MG: 250 CAPSULE ORAL at 22:00

## 2019-03-19 RX ADMIN — FENTANYL CITRATE 25 MCG: 50 INJECTION, SOLUTION INTRAMUSCULAR; INTRAVENOUS at 17:21

## 2019-03-19 RX ADMIN — FENTANYL CITRATE 25 MCG: 50 INJECTION, SOLUTION INTRAMUSCULAR; INTRAVENOUS at 17:25

## 2019-03-19 RX ADMIN — ACETAZOLAMIDE 250 MG: 250 TABLET ORAL at 21:59

## 2019-03-19 RX ADMIN — DEXAMETHASONE SODIUM PHOSPHATE 8 MG: 4 INJECTION, SOLUTION INTRAMUSCULAR; INTRAVENOUS at 15:33

## 2019-03-19 RX ADMIN — LIDOCAINE HYDROCHLORIDE 40 MG: 20 INJECTION, SOLUTION INFILTRATION; PERINEURAL at 15:30

## 2019-03-19 RX ADMIN — CEFAZOLIN 1 G: 330 INJECTION, POWDER, FOR SOLUTION INTRAMUSCULAR; INTRAVENOUS at 15:30

## 2019-03-19 ASSESSMENT — ENCOUNTER SYMPTOMS
MYALGIAS: 0
HEADACHES: 1
COUGH: 0
WEAKNESS: 0
NAUSEA: 0
HEMOPTYSIS: 0
FEVER: 0
DEPRESSION: 0
PALPITATIONS: 0
NECK PAIN: 0
BLURRED VISION: 0
CHILLS: 0
DIZZINESS: 0
TREMORS: 0
TINGLING: 0
HEARTBURN: 0
ORTHOPNEA: 0
DOUBLE VISION: 0
SINUS PAIN: 0

## 2019-03-19 ASSESSMENT — COGNITIVE AND FUNCTIONAL STATUS - GENERAL
SUGGESTED CMS G CODE MODIFIER MOBILITY: CH
MOBILITY SCORE: 24
DAILY ACTIVITIY SCORE: 24
SUGGESTED CMS G CODE MODIFIER DAILY ACTIVITY: CH

## 2019-03-19 ASSESSMENT — COPD QUESTIONNAIRES
HAVE YOU SMOKED AT LEAST 100 CIGARETTES IN YOUR ENTIRE LIFE: NO/DON'T KNOW
COPD SCREENING SCORE: 2
DO YOU EVER COUGH UP ANY MUCUS OR PHLEGM?: NO/ONLY WITH OCCASIONAL COLDS OR INFECTIONS
DURING THE PAST 4 WEEKS HOW MUCH DID YOU FEEL SHORT OF BREATH: NONE/LITTLE OF THE TIME

## 2019-03-19 ASSESSMENT — LIFESTYLE VARIABLES
EVER_SMOKED: NEVER
ALCOHOL_USE: NO

## 2019-03-19 ASSESSMENT — PATIENT HEALTH QUESTIONNAIRE - PHQ9
SUM OF ALL RESPONSES TO PHQ9 QUESTIONS 1 AND 2: 0
2. FEELING DOWN, DEPRESSED, IRRITABLE, OR HOPELESS: NOT AT ALL
1. LITTLE INTEREST OR PLEASURE IN DOING THINGS: NOT AT ALL

## 2019-03-19 ASSESSMENT — PAIN SCALES - GENERAL: PAIN_LEVEL: 0

## 2019-03-19 NOTE — PROGRESS NOTES
IR Nursing Note:    Pre procedure patient was AAOx4, SHEEHAN Pedal pulses +2 right +2 left.    Patient underwent a Cerebral angiogram with right ICA flow diverter placement by Dr. Tim anesthesia by Dr. Tabares. Patient was placed in a supine position. Anesthesia induced without incident.  Right femoral artery was accessed.  One Pipelline flow diverter was placed in the right ICA.  An angio-seal was used to achieve hemostasis.   A Tegaderm and gauze dressing was placed over surgical site.   Pt transported by hemant with anesthesia and RN to [facility] pacu in a stable condition.     Pipeline Flex Embolization Device 4.25mm x 18mm Right ICA Ref # PED - 425-18 Lot # [id number]    Angio-seal 8F Right femoral artery Ref # [id number] Lot #[id number]    Patient SHEEHAN post procedure, pedal pulses 2+ right 2+ left.  Dr. Tim spoke with patients  via phone for update.

## 2019-03-19 NOTE — OR SURGEON
Immediate Post- Operative Note        PostOp Diagnosis: Recurrent right ICA and  New left ICa aneuryms      Procedure(s):Flow diveroter -Pipeline-stent palcement      Estimated Blood Loss: Less than 5 ml        Complications: None            3/19/2019     4:44 PM     Aditya Tim

## 2019-03-19 NOTE — ANESTHESIA QCDR
2019 [facility] Clinical Data Registry (for Quality Improvement)     Postoperative nausea/vomiting risk protocol (Adult = 18 yrs and Pediatric 3-17 yrs)- (430 and 463)  General inhalation anesthetic (NOT TIVA) with PONV risk factors: Yes  Provision of anti-emetic therapy with at least 2 different classes of agents: Yes   Patient DID NOT receive anti-emetic therapy and reason is documented in Medical Record:  N/A    Multimodal Pain Management- (AQI59)  Patient undergoing Elective Surgery (i.e. Outpatient, or ASC, or Prescheduled Surgery prior to Hospital Admission): No  Use of Multimodal Pain Management, two or more drugs and/or interventions, NOT including systemic opioids: N/A  Exception: Documented allergy to multiple classes of analgesics: N/A    PACU assessment of acute postoperative pain prior to Anesthesia Care End- Applies to Patients Age = 18- (ABG7)  Initial PACU pain score is which of the following: < 7/10  Patient unable to report pain score: N/A    Post-anesthetic transfer of care checklist/protocol to PACU/ICU- (426 and 427)  Upon conclusion of case, patient transferred to which of the following locations: PACU/Non-ICU  Use of transfer checklist/protocol: Yes  Exclusion: Service Performed in Patient Hospital Room (and thus did not require transfer): N/A    PACU Reintubation- (AQI31)  General anesthesia requiring endotracheal intubation (ETT) along with subsequent extubation in OR or PACU: No  Required reintubation in the PACU: N/A  Extubation was a planned trial documented in the medical record prior to removal of the original airway device: N/A    Unplanned admission to ICU related to anesthesia service up through end of PACU care- (MD51)  Unplanned admission to ICU (not initially anticipated at anesthesia start time): No

## 2019-03-19 NOTE — ANESTHESIA PREPROCEDURE EVALUATION
Relevant Problems   (+) PONV (postoperative nausea and vomiting)      Within Normal Limits   CARDIAC   ENDO   GI   NEURO   PULMONARY       Physical Exam    Airway   Mallampati: II  TM distance: >3 FB  Neck ROM: full       Cardiovascular - normal exam  Rhythm: regular  Rate: normal  (-) murmur     Dental - normal exam         Pulmonary - normal exam  Breath sounds clear to auscultation     Abdominal    Neurological - normal exam                 Anesthesia Plan    ASA 2       Plan - general       Airway plan will be ETT        Induction: intravenous    Postoperative Plan: Postoperative administration of opioids is intended.    Pertinent diagnostic labs and testing reviewed    Informed Consent:    Anesthetic plan and risks discussed with patient.    Use of blood products discussed with: patient whom consented to blood products.

## 2019-03-19 NOTE — ANESTHESIA PROCEDURE NOTES
Airway  Date/Time: 3/19/2019 3:30 PM  Performed by: CHUCK FALCON  Authorized by: CHUCK FALCON     Location:  OR  Urgency:  Elective  Indications for Airway Management:  Anesthesia  Spontaneous Ventilation: absent    Sedation Level:  Deep  Preoxygenated: Yes    Patient Position:  Sniffing  Mask Difficulty Assessment:  0 - not attempted  Final Airway Type:  Endotracheal airway  Final Endotracheal Airway:  ETT  Cuffed: Yes    Technique Used for Successful ETT Placement:  Direct laryngoscopy  Insertion Site:  Oral  Blade Type:  Zambrano  Laryngoscope Blade/Videolaryngoscope Blade Size:  2  ETT Size (mm):  7.0  Measured from:  Teeth  ETT to Teeth (cm):  20  Placement Verified by: auscultation and capnometry    Number of Attempts at Approach:  1  Number of Other Approaches Attempted:  0

## 2019-03-20 VITALS
BODY MASS INDEX: 17.72 KG/M2 | DIASTOLIC BLOOD PRESSURE: 62 MMHG | WEIGHT: 110.23 LBS | TEMPERATURE: 97.9 F | OXYGEN SATURATION: 98 % | SYSTOLIC BLOOD PRESSURE: 123 MMHG | RESPIRATION RATE: 12 BRPM | HEART RATE: 63 BPM | HEIGHT: 66 IN

## 2019-03-20 LAB
ANION GAP SERPL CALC-SCNC: 5 MMOL/L (ref 0–11.9)
BUN SERPL-MCNC: 8 MG/DL (ref 8–22)
CALCIUM SERPL-MCNC: 7.6 MG/DL (ref 8.5–10.5)
CHLORIDE SERPL-SCNC: 115 MMOL/L (ref 96–112)
CO2 SERPL-SCNC: 19 MMOL/L (ref 20–33)
CREAT SERPL-MCNC: 0.63 MG/DL (ref 0.5–1.4)
ERYTHROCYTE [DISTWIDTH] IN BLOOD BY AUTOMATED COUNT: 52 FL (ref 35.9–50)
GLUCOSE SERPL-MCNC: 101 MG/DL (ref 65–99)
HCT VFR BLD AUTO: 36.3 % (ref 37–47)
HGB BLD-MCNC: 11.3 G/DL (ref 12–16)
MAGNESIUM SERPL-MCNC: 1.6 MG/DL (ref 1.5–2.5)
MCH RBC QN AUTO: 33.4 PG (ref 27–33)
MCHC RBC AUTO-ENTMCNC: 31.1 G/DL (ref 33.6–35)
MCV RBC AUTO: 107.4 FL (ref 81.4–97.8)
PLATELET # BLD AUTO: 183 K/UL (ref 164–446)
PMV BLD AUTO: 10 FL (ref 9–12.9)
POTASSIUM SERPL-SCNC: 3.5 MMOL/L (ref 3.6–5.5)
RBC # BLD AUTO: 3.38 M/UL (ref 4.2–5.4)
SODIUM SERPL-SCNC: 139 MMOL/L (ref 135–145)
WBC # BLD AUTO: 8.2 K/UL (ref 4.8–10.8)

## 2019-03-20 PROCEDURE — 83735 ASSAY OF MAGNESIUM: CPT

## 2019-03-20 PROCEDURE — A9270 NON-COVERED ITEM OR SERVICE: HCPCS | Performed by: HOSPITALIST

## 2019-03-20 PROCEDURE — 700102 HCHG RX REV CODE 250 W/ 637 OVERRIDE(OP): Performed by: HOSPITALIST

## 2019-03-20 PROCEDURE — 85027 COMPLETE CBC AUTOMATED: CPT

## 2019-03-20 PROCEDURE — A9270 NON-COVERED ITEM OR SERVICE: HCPCS | Performed by: INTERNAL MEDICINE

## 2019-03-20 PROCEDURE — 99239 HOSP IP/OBS DSCHRG MGMT >30: CPT | Performed by: HOSPITALIST

## 2019-03-20 PROCEDURE — 700102 HCHG RX REV CODE 250 W/ 637 OVERRIDE(OP): Performed by: INTERNAL MEDICINE

## 2019-03-20 PROCEDURE — 700111 HCHG RX REV CODE 636 W/ 250 OVERRIDE (IP): Performed by: HOSPITALIST

## 2019-03-20 PROCEDURE — 80048 BASIC METABOLIC PNL TOTAL CA: CPT

## 2019-03-20 PROCEDURE — 700111 HCHG RX REV CODE 636 W/ 250 OVERRIDE (IP): Performed by: INTERNAL MEDICINE

## 2019-03-20 RX ORDER — MAGNESIUM SULFATE HEPTAHYDRATE 40 MG/ML
4 INJECTION, SOLUTION INTRAVENOUS ONCE
Status: COMPLETED | OUTPATIENT
Start: 2019-03-20 | End: 2019-03-20

## 2019-03-20 RX ORDER — HYDROCODONE BITARTRATE AND ACETAMINOPHEN 10; 325 MG/1; MG/1
0.5 TABLET ORAL EVERY 4 HOURS PRN
Status: DISCONTINUED | OUTPATIENT
Start: 2019-03-20 | End: 2019-03-20

## 2019-03-20 RX ORDER — HYDROCODONE BITARTRATE AND ACETAMINOPHEN 10; 325 MG/1; MG/1
1 TABLET ORAL EVERY 4 HOURS PRN
Status: DISCONTINUED | OUTPATIENT
Start: 2019-03-20 | End: 2019-03-20 | Stop reason: HOSPADM

## 2019-03-20 RX ORDER — SUMATRIPTAN 50 MG/1
100 TABLET, FILM COATED ORAL
Status: DISCONTINUED | OUTPATIENT
Start: 2019-03-20 | End: 2019-03-20 | Stop reason: HOSPADM

## 2019-03-20 RX ORDER — POTASSIUM CHLORIDE 20 MEQ/1
40 TABLET, EXTENDED RELEASE ORAL ONCE
Status: COMPLETED | OUTPATIENT
Start: 2019-03-20 | End: 2019-03-20

## 2019-03-20 RX ORDER — HYDROMORPHONE HYDROCHLORIDE 1 MG/ML
.5-2 INJECTION, SOLUTION INTRAMUSCULAR; INTRAVENOUS; SUBCUTANEOUS
Status: DISCONTINUED | OUTPATIENT
Start: 2019-03-20 | End: 2019-03-20 | Stop reason: HOSPADM

## 2019-03-20 RX ADMIN — POTASSIUM CHLORIDE 40 MEQ: 1500 TABLET, EXTENDED RELEASE ORAL at 09:28

## 2019-03-20 RX ADMIN — HYDROCODONE BITARTRATE AND ACETAMINOPHEN 0.5 TABLET: 10; 325 TABLET ORAL at 06:13

## 2019-03-20 RX ADMIN — OMEPRAZOLE 20 MG: 20 CAPSULE, DELAYED RELEASE ORAL at 05:33

## 2019-03-20 RX ADMIN — SUCRALFATE 1 G: 1 TABLET ORAL at 09:31

## 2019-03-20 RX ADMIN — SENNOSIDES AND DOCUSATE SODIUM 2 TABLET: 8.6; 5 TABLET ORAL at 05:34

## 2019-03-20 RX ADMIN — MAGNESIUM SULFATE IN WATER 4 G: 40 INJECTION, SOLUTION INTRAVENOUS at 09:44

## 2019-03-20 RX ADMIN — SUMATRIPTAN SUCCINATE 50 MG: 50 TABLET ORAL at 10:34

## 2019-03-20 RX ADMIN — HYDROCODONE BITARTRATE AND ACETAMINOPHEN 0.5 TABLET: 10; 325 TABLET ORAL at 01:23

## 2019-03-20 RX ADMIN — MYCOPHENOLATE MOFETIL 750 MG: 250 CAPSULE ORAL at 05:34

## 2019-03-20 RX ADMIN — TACROLIMUS 1 MG: 1 CAPSULE ORAL at 05:33

## 2019-03-20 RX ADMIN — ACYCLOVIR 800 MG: 800 TABLET ORAL at 09:31

## 2019-03-20 RX ADMIN — HYDROCODONE BITARTRATE AND ACETAMINOPHEN 0.5 TABLET: 10; 325 TABLET ORAL at 05:35

## 2019-03-20 RX ADMIN — HYDROMORPHONE HYDROCHLORIDE 0.5 MG: 1 INJECTION, SOLUTION INTRAMUSCULAR; INTRAVENOUS; SUBCUTANEOUS at 09:44

## 2019-03-20 NOTE — PROGRESS NOTES
S/p uncomplicated right  artery aneurysm repair  on 3/19/19 by Aditya Tim MD Admitted to ICU for post procedure neurologic monitoring.     Awake, oriented x 4. Face symmetric, speech fluent.    From a NeuroInterventional Service standpoint, OK to discharge to home when medically cleared by Hospitalist Service. Patient to follow up in our clinic in 2 weeks, our office to arrange appointment. Do  need to continue aspirin and Plavix from our standpoint.     Post-angioseal instructions: no lifting greater than 5 lbs and no baths/ swimming/ soaking in tub for 5 days. Shower OK. OK to change dressings to band aid as needed.

## 2019-03-20 NOTE — CARE PLAN
Problem: Safety  Goal: Will remain free from falls  Outcome: PROGRESSING AS EXPECTED  Pt oriented to room an call light within reach. Bed locked and in low position. Bed alarm activated. Pt checked q1h. Family at bedside    Problem: Pain Management  Goal: Pain level will decrease to patient's comfort goal  Outcome: PROGRESSING SLOWER THAN EXPECTED   03/19/19 4853   OTHER   Pain Rating Scale (NPRS) 5    pt with constant ache. Medication not resolving post procedure

## 2019-03-20 NOTE — DISCHARGE SUMMARY
Discharge Summary    CHIEF COMPLAINT ON ADMISSION  No chief complaint on file.  Elective admission for right ICA aneurysm repair    Reason for Admission  Elective repair of a right ICA aneurysm by interventional radiology    Admission Date  3/19/2019    CODE STATUS  Prior  Full code  HPI & HOSPITAL COURSE  This is a 63 y.o. female here with a history of liver transplant secondary to primary biliary cirrhosis, admitted for elective right ICA aneurysm repair by Dr. Tim from interventional radiology.  Patient tolerated procedure well on 3/19/2019 lymphoma for flow divertor- pipeline stent placement.  The patient had no neurologic changes.  She did have a typical migraine headache which she had before.  She had no focal weakness or changes, was evaluated by interventional radiology and cleared for discharge to stay on aspirin and Plavix.  The patient did have some bleeding from the right angiography site which was stabilized with compression, on 3/20/2019 that was no further bleeding noted and the patient felt well and was evaluated and found stable for discharge.  Discharge instructions and follow-up was discussed with the patient at the bedside with her  present       Therefore, she is discharged in good and stable condition to home with close outpatient follow-up.    The patient recovered much more quickly than anticipated on admission.    Discharge Date  3/20/2019    FOLLOW UP ITEMS POST DISCHARGE  Interventional radiology in 2 weeks    DISCHARGE DIAGNOSES  Principal Problem:    Right internal carotid artery aneurysm POA: Yes  Active Problems:    PONV (postoperative nausea and vomiting) POA: Yes    Primary biliary cirrhosis (HCC) POA: Yes      Overview: s/p liver transplant 1999    H/O liver transplant (HCC) POA: Yes  Resolved Problems:    * No resolved hospital problems. *      FOLLOW UP  No future appointments.  Fred Mccain M.D.  [address] [place] [address]  [phone]    In 1  week        MEDICATIONS ON DISCHARGE     Medication List      CONTINUE taking these medications      Instructions   acetaZOLAMIDE 250 MG Tabs  Commonly known as:  DIAMOX   Take 250 mg by mouth 2 times a day.  Dose:  250 mg     acyclovir 800 MG Tabs  Commonly known as:  ZOVIRAX   Take 800 mg by mouth 4 times a day.  Dose:  800 mg     AMBIEN 10 MG Tabs  Generic drug:  zolpidem   Take 15-20 mg by mouth at bedtime as needed for Sleep.  Dose:  15-20 mg     aspirin 81 MG tablet   Take 81 mg by mouth every day.  Dose:  81 mg     CALCIUM + D PO   Take 1 Tab by mouth 2 Times a Day.  Dose:  1 Tab     clopidogrel 75 MG Tabs  Commonly known as:  PLAVIX   Take 75 mg by mouth every day.  Dose:  75 mg     estradiol 0.05 MG/24HR Pttw  Commonly known as:  VIVELLE DOT   Apply 1 Patch to skin as directed 2 times a week. Use one patch two times per week.  Dose:  1 Patch     GLUCOSAMINE 1500 COMPLEX PO   Take 1 Tab by mouth 2 Times a Day.  Dose:  1 Tab     HYDROcodone/acetaminophen  MG Tabs  Commonly known as:  NORCO   Take 1 Tab by mouth every 6 hours as needed.  Dose:  1 Tab     Magnesium 125 MG Caps   Take 1 Cap by mouth 2 Times a Day.  Dose:  1 Cap     MELATONIN PO   Take 20 mg by mouth every bedtime.  Dose:  20 mg     mycophenolate 250 MG Caps  Commonly known as:  CELLCEPT   Take 750 mg by mouth 2 times a day. Indications: Body's Rejection of a Transplanted Organ  Dose:  750 mg     pantoprazole 40 MG Tbec  Commonly known as:  PROTONIX   Take 40 mg by mouth every day.  Dose:  40 mg     progesterone 100 MG Caps  Commonly known as:  PROMETRIUM   Take 100 mg by mouth every evening.  Dose:  100 mg     sucralfate 1 GM Tabs  Commonly known as:  CARAFATE   Take 1 g by mouth 4 times a day.  Dose:  1 g     SUMAtriptan 50 MG Tabs  Commonly known as:  IMITREX   Take 100 mg by mouth Once PRN.  Dose:  100 mg     tacrolimus 1 MG Caps  Commonly known as:  PROGRAF   Take 1 mg by mouth 2 times a day. Indications: Body's Rejection of a  Transplanted Organ  Dose:  1 mg     terbinafine 250 MG Tabs  Commonly known as:  LAMISIL   Take 1 Tab by mouth See Admin Instructions. TAKE 1 TABLET BY MOUTH DAILY x 7 days of month (1st-7th)  Dose:  1 Tab     vitamin D 1000 UNIT Tabs  Commonly known as:  cholecalciferol   Take 1,000 Units by mouth every evening.  Dose:  1000 Units            Allergies  Allergies   Allergen Reactions   • Codeine Vomiting       DIET  No orders of the defined types were placed in this encounter.      ACTIVITY  As tolerated.  Weight bearing as tolerated    CONSULTATIONS  Interventional radiology  Critical care    PROCEDURES  Right ICA flow divertor/pipeline stent placement    LABORATORY  Lab Results   Component Value Date    SODIUM 139 03/20/2019    POTASSIUM 3.5 (L) 03/20/2019    CHLORIDE 115 (H) 03/20/2019    CO2 19 (L) 03/20/2019    GLUCOSE 101 (H) 03/20/2019    BUN 8 03/20/2019    CREATININE 0.63 03/20/2019        Lab Results   Component Value Date    WBC 8.2 03/20/2019    HEMOGLOBIN 11.3 (L) 03/20/2019    HEMATOCRIT 36.3 (L) 03/20/2019    PLATELETCT 183 03/20/2019        Total time of the discharge process exceeds 45 minutes.

## 2019-03-20 NOTE — PROGRESS NOTES
· 2 RN skin check complete with SHARIFA Perea.  · Devices in place oxygen, EKG, BP cuff, PIV x1, SCDs, sandbag on right groin site  · Skin assessed under devices Yes.  · Confirmed pressure ulcers found on n/a.  · New potential pressure ulcers noted on n/a  · The following interventions in place, pt turned at least q 2 hours.

## 2019-03-20 NOTE — OR NURSING
Pt has not been seen in PACU by admitting MD. Per IR, Dr. Pastrana has talked to Dr. Esteban.   Dr. Esteban paged and notified that pt will be transferring to S136 and current PACU orders will be discontinued.   Dr. Esteban states he will place admit orders.

## 2019-03-20 NOTE — PROGRESS NOTES
Received report from PACU nurse Camille SKAGGS. Pt oriented to room, call light and updated on POC. Discussed with family at bedside

## 2019-03-20 NOTE — PROGRESS NOTES
Spoke with YUDITH Taylor about pt home medication. Pt takes whole tablet of Norco 10 and thinks she was misunderstood when med rec came by. Informed Dr. Taylor and he ordered to change back to patients home dose

## 2019-03-20 NOTE — CARE PLAN
Problem: Knowledge Deficit  Goal: Knowledge of disease process/condition, treatment plan, diagnostic tests, and medications will improve  Patient educated on treatment plan, medications, diagnostic testing, disease process. Questions regarding plan of care answered, patient verbalizes an understanding.    Problem: Pain Management  Goal: Pain level will decrease to patient's comfort goal  Assess pain using 0-10 scale and manage using PRN pain medication, rest, emotional support.

## 2019-03-20 NOTE — OR NURSING
1655: Pt arrived to PACU, monitors applied ad report received from MD and RN.   Pt sleeping on arrival. VSS on 3 LNC. Right groin drsg intact with small amount of blood present. Right pedal and post tibial pulses +2.     1700: Pt sleeping, drsg unchanged.     1710: Pt laying still with eyes closed however is complaining of headache. Pulses and drsg unchanged.     1715: Pt remains still with eyes closed. New drainage noted to right groin drsg. Pressure held and IR notified.    1720: Karen IR RN and 2 other IR staff members came to PACU to assess groin and change drsg. IR team removed drsg and held pressure until hemostasis was obtained. New drsg and sand bag applied.     Will remove sandbag in 15mins.     VSS. Will continue to monitor.

## 2019-03-20 NOTE — OR NURSING
Pt reports tolerating hydrocodone better than oxycodone. Dr. Tabares called and notified. New orders given. Hycet administered for HA. Will continue to monitor.   Right groin site soft, CDI. Pulse +2.

## 2019-03-20 NOTE — CARE PLAN
Problem: Nutritional:  Goal: Achieve adequate nutritional intake  Patient will consume >50% of meals / snack  Outcome: PROGRESSING AS EXPECTED

## 2019-03-20 NOTE — ADDENDUM NOTE
Addendum  created 03/19/19 1706 by Ken Tabares M.D.    Order list changed, Order sets accessed

## 2019-03-20 NOTE — DIETARY
"Nutrition services: Day 1 of admit. Yane Mendoza is a 63 y.o. female with admitting DX of Cerebral arteritis, not elsewhere classified [I67.7], Intracranial aneurysm    Consult received for BMI <19 (=17.79)    Assessment:  Height: 167.6 cm (5' 6\")  Weight: 50 kg (110 lb 3.7 oz)  Body mass index is 17.79 kg/m² - underweight  Diet/Intake: Low fiber, PO intake % x1    Evaluation:   1. Pt visited to see how meals have been going. She enjoyed breakfast today and ate most of her meal.  2. # stable since transplant around 20 years ago. Wt was 107# on 3/15 by standing scale. No recent wt loss per the pt.  3. She endorses a good appetite. She normally snacks on home. Will add snack 1x day to be more like home routine.  4. Labs: K+ 3.5, Calcium 7.6  5. Fluids: NS @ 50mL/hr    Malnutrition Risk: No criteria noted.    Recommendations/Plan:  1. Continue low fiber diet. ADAT.   2. Encourage intake of meals/snack  3. Document intake of all meals/snack as % taken in ADL's to provide interdisciplinary communication across all shifts.   4. Monitor weight.  5. Nutrition rep will continue to see patient for ongoing meal and snack preferences.     RD monitoring.  "

## 2019-03-20 NOTE — ANESTHESIA TIME REPORT
Anesthesia Start and Stop Event Times     Date Time Event    3/19/2019 1523 Anesthesia Start     1700 Anesthesia Stop        Responsible Staff  03/19/19    Name Role Begin End    Ken Tabares M.D. Anesth 1523 1700        Post op Diagnosis  Intracranial aneurysm    Premium Reason  A. 3PM - 7AM      Exception Times    Start Time             End Time                     #                         Name                                                Comments:

## 2019-03-20 NOTE — DISCHARGE INSTRUCTIONS
Discharge Instructions    Discharged to home by car   with relative. Discharged via walking, hospital escort: Refused.  Special equipment needed: Not Applicable    Be sure to schedule a follow-up appointment with your primary care doctor or any specialists as instructed.     Discharge Plan:   Diet Plan: Discussed  Activity Level: Discussed  Confirmed Follow up Appointment: Patient to Call and Schedule Appointment  Confirmed Symptoms Management: Discussed  Pneumococcal Vaccine Administered/Refused: Not given - Patient refused pneumococcal vaccine  Influenza Vaccine Indication: Patient Refuses    I understand that a diet low in cholesterol, fat, and sodium is recommended for good health. Unless I have been given specific instructions below for another diet, I accept this instruction as my diet prescription.   Other diet: Regular    Special Instructions: None    · Is patient discharged on Warfarin / Coumadin?   No     Depression / Suicide Risk    As you are discharged from this [facility] facility, it is important to learn how to keep safe from harming yourself.    Recognize the warning signs:  · Abrupt changes in personality, positive or negative- including increase in energy   · Giving away possessions  · Change in eating patterns- significant weight changes-  positive or negative  · Change in sleeping patterns- unable to sleep or sleeping all the time   · Unwillingness or inability to communicate  · Depression  · Unusual sadness, discouragement and loneliness  · Talk of wanting to die  · Neglect of personal appearance   · Rebelliousness- reckless behavior  · Withdrawal from people/activities they love  · Confusion- inability to concentrate     If you or a loved one observes any of these behaviors or has concerns about self-harm, here's what you can do:  · Talk about it- your feelings and reasons for harming yourself  · Remove any means that you might use to hurt yourself (examples: pills, rope, extension cords,  firearm)  · Get professional help from the community (Mental Health, Substance Abuse, psychological counseling)  · Do not be alone:Call your Safe Contact- someone whom you trust who will be there for you.  · Call your local CRISIS HOTLINE 655-8754 or 786-080-1146  · Call your local Children's Mobile Crisis Response Team [place] (028) 453-0177 or www.Anaphore  · Call the toll free National Suicide Prevention Hotlines   · National Suicide Prevention Lifeline 054-860-VCYA (6079)  · National Hope Line Network 800-SUICIDE (103-1824)

## 2019-03-20 NOTE — ASSESSMENT & PLAN NOTE
Status post Flow diveroter -Pipeline-stent palcement on 3/19/2019, continue aspirin and Plavix, ICU observation, neurochecks every hour, discussed with nurse staff and with IR.

## 2019-03-20 NOTE — OR NURSING
1900: Pts groin site oozing again. Sandbag reapplied. Dr. Pastrana called and notifed.Order received to use sandbag until hemostasis obtained. If sandbag ineffective, fem-stop is to be used. Order also obtained to keep pt flat until hemostasis is obtained and then 1 hour longer.     Dr. Pastrana also notified pt is unable to void and bladder scan >650CC. Order obtained to straight cath.

## 2019-03-20 NOTE — ANESTHESIA POSTPROCEDURE EVALUATION
Patient: Yane Mendoza    Procedure Summary     Date:  03/19/19 Room / Location:  [facility] - INTERVENTIONAL - [facility]    Anesthesia Start:  1523 Anesthesia Stop:  1700    Procedure:  IR-EMBOLIZE-NEURO-INTRACRANIAL Diagnosis:       Intracranial aneurysm      (recurrent right ICA aneurysm)    Scheduled Providers:   Responsible Provider:  Ken Tabares M.D.    Anesthesia Type:  general ASA Status:  2          Final Anesthesia Type: general  Last vitals  BP   Blood Pressure: 123/62    Temp   37 °C (98.6 °F)    Pulse   Pulse: 70   Resp   16    SpO2   99 %      Anesthesia Post Evaluation    Patient location during evaluation: PACU  Patient participation: complete - patient participated  Level of consciousness: awake and alert  Pain score: 0    Airway patency: patent  Anesthetic complications: no  Cardiovascular status: hemodynamically stable  Respiratory status: acceptable  Hydration status: euvolemic    PONV: none           Nurse Pain Score: 0 (NPRS)

## 2019-03-20 NOTE — H&P
Hospital Medicine History & Physical Note    Date of Service  3/19/2019    Primary Care Physician  Fred Mccain M.D.    Consultants  IR    Code Status  Full code    Chief Complaint  Recurrent right ICA aneurysm    History of Presenting Illness  63 y.o. 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, it is coming today for elective right ICA aneurysm stent placement by , patient had right femoral artery access without complications as per radiology notes, patient was seen in the ICU she is alert oriented follows commands, family is at bedside, stated that she had mild headache after the procedure but got better with pain medication, denies any nausea or vomiting at this time, she follows commands, I have discussed with IR and recommended to continue aspirin and Plavix at this time, continue ICU observation with neurochecks every hour, vital signs per ICU protocol, patient denies any diarrhea constipation, no urinary symptoms, no numbness tingling or focal weakness, patient expressed understanding of her plan of care and agree with it, all questions answered.    Review of Systems  Review of Systems   Constitutional: Negative for chills and fever.   HENT: Negative for congestion, hearing loss and sinus pain.    Eyes: Negative for blurred vision and double vision.   Respiratory: Negative for cough and hemoptysis.    Cardiovascular: Negative for chest pain, palpitations and orthopnea.   Gastrointestinal: Negative for heartburn and nausea.   Genitourinary: Negative for dysuria and urgency.   Musculoskeletal: Negative for myalgias and neck pain.   Skin: Negative for itching.   Neurological: Positive for headaches (Improved). Negative for dizziness, tingling, tremors and weakness.   Psychiatric/Behavioral: Negative for depression and suicidal ideas.       Past Medical History   has a past medical history of Anesthesia; Cataracts, bilateral; H/O  liver transplant (HCC); Jaundice (1999); Migraine headache; PONV (postoperative nausea and vomiting); Primary biliary cirrhosis (HCC); Right internal carotid artery aneurysm; and Snoring.    Surgical History   has a past surgical history that includes pr transplant liver,allotransplant (5/30/1999); pr suboccipt decomp medulla/sp crd (9/4/2008); and recovery (6/4/2012).     Family History  Reviewed not contributory.    Social History   reports that she has never smoked. She has never used smokeless tobacco. She reports that she does not drink alcohol or use drugs.    Allergies  Allergies   Allergen Reactions   • Codeine Vomiting       Medications  Prior to Admission Medications   Prescriptions Last Dose Informant Patient Reported? Taking?   Calcium Carbonate-Vitamin D (CALCIUM + D PO) 3/18/2019 at 2200 Patient Yes No   Sig: Take 1 Tab by mouth 2 Times a Day.   Glucosamine-Chondroit-Vit C-Mn (GLUCOSAMINE 1500 COMPLEX PO) 3/18/2019 at 2200 Patient Yes Yes   Sig: Take 1 Tab by mouth 2 Times a Day.   MELATONIN PO 3/18/2019 at 2200 Patient Yes Yes   Sig: Take 20 mg by mouth every bedtime.   Magnesium 125 MG Cap 3/18/2019 at 2200 Patient Yes Yes   Sig: Take 1 Cap by mouth 2 Times a Day.   acetaZOLAMIDE (DIAMOX) 250 MG TABS 3/19/2019 at 0730 Patient Yes No   Sig: Take 250 mg by mouth 2 times a day.   acyclovir (ZOVIRAX) 800 MG Tab 3/18/2019 at 2200 Patient Yes Yes   Sig: Take 800 mg by mouth 4 times a day.   aspirin 81 MG tablet 3/18/2019 at 1200 Patient Yes Yes   Sig: Take 81 mg by mouth every day.   clopidogrel (PLAVIX) 75 MG Tab 3/18/2019 at 1200 Patient Yes Yes   Sig: Take 75 mg by mouth every day.   estradiol (VIVELLE DOT) 0.05 MG/24HR PTTW 3/18/2019 at 0730 on Patient Yes No   Sig: Apply 1 Patch to skin as directed 2 times a week. Use one patch two times per week.    hydrocodone/acetaminophen (NORCO)  MG TABS 3/19/2019 at 0400 Patient Yes No   Sig: Take 0.5 Tabs by mouth every 6 hours as needed.   mycophenolate  (CELLCEPT) 250 MG CAPS 3/19/2019 at 0730 Patient Yes No   Sig: Take 750 mg by mouth 2 times a day. Indications: Body's Rejection of a Transplanted Organ   pantoprazole (PROTONIX) 40 MG Tablet Delayed Response 3/19/2019 at 0730 Patient Yes Yes   Sig: Take 40 mg by mouth every day.   progesterone (PROMETRIUM) 100 MG CAPS 3/18/2019 at 2200 Patient Yes No   Sig: Take 100 mg by mouth every evening.   sucralfate (CARAFATE) 1 GM TABS 3/18/2019 at 2200 Patient Yes No   Sig: Take 1 g by mouth 4 times a day.   sumatriptan (IMITREX) 50 MG TABS 3/16/2019 at Unknown time Patient Yes No   Sig: Take 100 mg by mouth Once PRN.   tacrolimus (PROGRAF) 1 MG CAPS 3/19/2019 at 0730 Patient Yes No   Sig: Take 1 mg by mouth 2 times a day. Indications: Body's Rejection of a Transplanted Organ   terbinafine (LAMISIL) 250 MG Tab 3/7/2019 at Unknown time Patient Yes No   Sig: Take 1 Tab by mouth See Admin Instructions. TAKE 1 TABLET BY MOUTH DAILY x 7 days of month (1st-7th)   vitamin D (CHOLECALCIFEROL) 1000 UNIT Tab 3/18/2019 at 2200 Patient Yes Yes   Sig: Take 1,000 Units by mouth every evening.   zolpidem (AMBIEN) 10 MG Tab 3/18/2019 at 2200 Patient Yes Yes   Sig: Take 15-20 mg by mouth at bedtime as needed for Sleep.      Facility-Administered Medications: None       Physical Exam  Temp:  [36.2 °C (97.2 °F)-37 °C (98.6 °F)] 37 °C (98.6 °F)  Pulse:  [70] 70  Resp:  [10-16] 12  BP: (123)/(62) 123/62  SpO2:  [97 %-100 %] 99 %    Physical Exam   Constitutional: She is oriented to person, place, and time. She appears well-nourished. No distress.   HENT:   Head: Normocephalic and atraumatic.   Mouth/Throat: No oropharyngeal exudate.   Eyes: Conjunctivae are normal. Right eye exhibits no discharge. Left eye exhibits no discharge. No scleral icterus.   Neck: Normal range of motion. Neck supple. No JVD present.   Cardiovascular: Normal heart sounds.  Exam reveals no friction rub.    Pulmonary/Chest: Effort normal and breath sounds normal. No  stridor. No respiratory distress. She has no wheezes. She has no rales.   Abdominal: Soft. Bowel sounds are normal. She exhibits no distension. There is no tenderness.   Musculoskeletal: Normal range of motion. She exhibits no edema.   Lymphadenopathy:     She has no cervical adenopathy.   Neurological: She is alert and oriented to person, place, and time. She exhibits normal muscle tone.   Skin: No erythema.   Psychiatric: She has a normal mood and affect.   Nursing note and vitals reviewed.      Laboratory:          No results for input(s): ALTSGPT, ASTSGOT, ALKPHOSPHAT, TBILIRUBIN, DBILIRUBIN, GAMMAGT, AMYLASE, LIPASE, ALB, PREALBUMIN, GLUCOSE in the last 72 hours.              No results for input(s): TROPONINI in the last 72 hours.    Urinalysis:    No results found     Imaging:  IR-EMBOLIZE-NEURO-INTRACRANIAL    (Results Pending)         Assessment/Plan:  I anticipate this patient will require at least two midnights for appropriate medical management, necessitating inpatient admission.    * Right internal carotid artery aneurysm- (present on admission)   Assessment & Plan    Status post Flow diveroter -Pipeline-stent palcement on 3/19/2019, continue aspirin and Plavix, ICU observation, neurochecks every hour, discussed with nurse staff and with IR.     H/O liver transplant (HCC)- (present on admission)   Assessment & Plan    Stable continue home medications     Primary biliary cirrhosis (HCC)- (present on admission)   Assessment & Plan    Status post liver transplant in 1999, continue transplant medication     PONV (postoperative nausea and vomiting)- (present on admission)   Assessment & Plan    Continue as needed Zofran         VTE prophylaxis: SCDs

## 2019-04-08 ENCOUNTER — HOSPITAL ENCOUNTER (OUTPATIENT)
Dept: RADIOLOGY | Facility: MEDICAL CENTER | Age: 64
End: 2019-04-08
Attending: RADIOLOGY

## 2019-04-08 DIAGNOSIS — I67.1 INTRACRANIAL ANEURYSM: ICD-10-CM

## 2019-04-08 ASSESSMENT — ENCOUNTER SYMPTOMS
ABDOMINAL PAIN: 1
DIZZINESS: 1
HEADACHES: 1
BRUISES/BLEEDS EASILY: 1
WEIGHT LOSS: 1
FOCAL WEAKNESS: 0

## 2019-04-08 ASSESSMENT — LIFESTYLE VARIABLES: SUBSTANCE_ABUSE: 0

## 2019-04-08 NOTE — CONSULTS
Neuro Interventional Service Follow Up     Re: Yane Mendoza     MRN: [id number]   : 1955    Yane Mendoza was seen today in follow up after intracranial aneurysm intervention performed by Dr. Tim at [facility] on 2019.  She was initially referred to our service in  by Dhiraj La MD and is also under the care of Beatrice Mccarthy MD and Fred Mccain MD.    History of Present Illness:   is known to our practice for an elective repair of a right superior hypophyseal artery aneurysm in  but was lost to follow up after that time. She has a history of headaches and was found to have a Chiari I Malformation, for which she underwent surgical intervention in . Ms. Mendoza additionally has a history of a liver transplant in . Imaging showed an incidental 7 mm unruptured right internal carotid artery aneurysm in  and she elected surveillance of the stable aneurysm until , when she underwent an uncomplicated aneurysm coiling with Aditya Tim MD. She never had follow up and her headache complaints resolved until , when she experienced the return of headaches outside her usual pattern that she attributed to the aneurysm. An updated MRA in  showed a possible 3-4 mm recurrence at the base of the previously treated aneurysm. Dr. Tim performed embolization of the recurrent aneurysm on  with placement of a Pipeline Flow Diverter. The patient's clinical course was unremarkable. She was discharged from [facility] on . She is seen today for evaluation after the procedure. Today, the patient reports that the headaches she had immediately post procedure have resolved, however the headaches that prompted the recent evaluation of the aneurysm persist.   She denies any difficulty with bleeding although she admits to bruising easily. She also states that she was taken off omeprazole and placed on pantoprazole due to the clopidogrel and aspirin course,  however she is experiencing stomach pain and does not think this medication is working well for her. She also takes Carafate 4 times daily. The stomach pain is affecting her appetite and she does not want to eat. 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 She is accompanied by her  today.    Past Medical History:   Diagnosis Date   • Anesthesia     ponv   • Cataracts, bilateral    • H/O liver transplant (HCC)    • Jaundice 1999    PBC   • Migraine headache    • PONV (postoperative nausea and vomiting)     Sometimes a headache (migraine).   • Primary biliary cirrhosis (HCC)     s/p liver transplant 1999   • Right internal carotid artery aneurysm    • Snoring      Past Surgical History:   Procedure Laterality Date   • RECOVERY  6/4/2012    Performed by SURGERY, IR-RECOVERY at [facility]   • PB SUBOCCIPT DECOMP MEDULLA/SP CRD  9/4/2008    [place], [place]   • PB TRANSPLANT LIVER,ALLOTRANSPLANT  5/30/1999    [place], [place]     Social History     Social History   • Marital status:      Spouse name: N/A   • Number of children: N/A   • Years of education: N/A     Occupational History   • Not on file.     Social History Main Topics   • Smoking status: Never Smoker   • Smokeless tobacco: Never Used   • Alcohol use No   • Drug use: No   • Sexual activity: Not on file     Other Topics Concern   • Not on file     Social History Narrative   • No narrative on file     No family history on file.    Review of Systems   Constitutional: Positive for weight loss.   Gastrointestinal: Positive for abdominal pain.   Neurological: Positive for dizziness and headaches. Negative for focal weakness.   Endo/Heme/Allergies: Bruises/bleeds easily.   Psychiatric/Behavioral: Negative for substance abuse.     A comprehensive 14-point review of systems was negative except as described above.     Labs:    Ref. Range  3/15/2019 12:38 3/19/2019 17:02 3/20/2019 04:15   WBC Latest Ref Range: 4.8 - 10.8 K/uL 6.7  8.2   RBC Latest Ref Range: 4.20 - 5.40 M/uL 3.96 (L)  3.38 (L)   Hemoglobin Latest Ref Range: 12.0 - 16.0 g/dL 13.2  11.3 (L)   Hematocrit Latest Ref Range: 37.0 - 47.0 % 42.5  36.3 (L)   MCV Latest Ref Range: 81.4 - 97.8 fL 107.3 (H)  107.4 (H)   MCH Latest Ref Range: 27.0 - 33.0 pg 33.3 (H)  33.4 (H)   MCHC Latest Ref Range: 33.6 - 35.0 g/dL 31.1 (L)  31.1 (L)   RDW Latest Ref Range: 35.9 - 50.0 fL 51.3 (H)  52.0 (H)   Platelet Count Latest Ref Range: 164 - 446 K/uL 242  183   MPV Latest Ref Range: 9.0 - 12.9 fL 10.3  10.0   Sodium Latest Ref Range: 135 - 145 mmol/L 140  139   Potassium Latest Ref Range: 3.6 - 5.5 mmol/L 3.7  3.5 (L)   Chloride Latest Ref Range: 96 - 112 mmol/L 109  115 (H)   Co2 Latest Ref Range: 20 - 33 mmol/L 23  19 (L)   Anion Gap Latest Ref Range: 0.0 - 11.9  8.0  5.0   Glucose Latest Ref Range: 65 - 99 mg/dL 107 (H)  101 (H)   Bun Latest Ref Range: 8 - 22 mg/dL 16  8   Creatinine Latest Ref Range: 0.50 - 1.40 mg/dL 0.91  0.63   GFR If  Latest Ref Range: >60 mL/min/1.73 m 2 >60  >60   GFR If Non  Latest Ref Range: >60 mL/min/1.73 m 2 >60  >60   Calcium Latest Ref Range: 8.5 - 10.5 mg/dL 9.0  7.6 (L)   AST(SGOT) Latest Ref Range: 12 - 45 U/L 16     ALT(SGPT) Latest Ref Range: 2 - 50 U/L 16     Alkaline Phosphatase Latest Ref Range: 30 - 99 U/L 55     Total Bilirubin Latest Ref Range: 0.1 - 1.5 mg/dL 0.6     Albumin Latest Ref Range: 3.2 - 4.9 g/dL 4.5     Total Protein Latest Ref Range: 6.0 - 8.2 g/dL 7.4     Globulin Latest Ref Range: 1.9 - 3.5 g/dL 2.9     A-G Ratio Latest Units: g/dL 1.6     Magnesium Latest Ref Range: 1.5 - 2.5 mg/dL   1.6   PT Latest Ref Range: 12.0 - 14.6 sec 13.8     INR Latest Ref Range: 0.87 - 1.13  1.04     Reaction Time Initial-R Latest Ref Range: 5.0 - 10.0 min 4.6 (L)     Clot Kinetics-K Latest Ref Range: 1.0 - 3.0 min 2.0     Clot  Angle-Angle Latest Ref Range: 53.0 - 72.0 degrees 63.3     Maximum Clot Strength-MA Latest Ref Range: 50.0 - 70.0 mm 60.4     Lysis 30 minutes-LY30 Latest Ref Range: 0.0 - 8.0 % 0.3     % Inhibition AA Latest Units: % 94.5     % Inhibition ADP Latest Units: % 30.4     TEG Algorithm Unknown Link Algorithm         Radiology:   Neurointerventional procedure March 19, 2019 at [facility]:  Endovascular repair of recurrent and a new right internal carotid aneurysms using a single Pipeline Flow Diverter.      MRA head on January 29, 2019 at [facility]:  1.  Normal variant dominant distal right vertebral artery. Hypoplastic left distal vertebral artery V4 segment.  2.  Coil pack identified at the site of right superior hypophyseal aneurysm coiling with 3-3.5 mm residual or recurrent aneurysm.  3.  Neuro interventional consultation for possible reevaluation or retreatment would be recommended.       MRI brain January 29, 2019 at [facility]:  1.  Concerning the given history of Chiari I malformation, there appears to be postoperative change with suboccipital decompression.  2.  Status post coiling of right superior hypophyseal aneurysm. The coil pack is not identified on the MRI but is well seen on CT from 6/5/2012.  3.  Remainder of the study is unremarkable.     Neurointerventional procedure June 4, 2012 at [facility]:  Endovascular repair of right superior hypophyseal aneurysm.       Current Outpatient Prescriptions   Medication Sig Dispense Refill   • terbinafine (LAMISIL) 250 MG Tab Take 1 Tab by mouth See Admin Instructions. TAKE 1 TABLET BY MOUTH DAILY x 7 days of month (1st-7th)  2   • zolpidem (AMBIEN) 10 MG Tab Take 15-20 mg by mouth at bedtime as needed for Sleep.     • pantoprazole (PROTONIX) 40 MG Tablet Delayed Response Take 40 mg by mouth every day.     • clopidogrel (PLAVIX) 75 MG Tab Take 75 mg by mouth every day.     • acyclovir (ZOVIRAX) 800 MG Tab Take 800 mg by mouth 4 times a day.     • vitamin D (CHOLECALCIFEROL) 1000  UNIT Tab Take 1,000 Units by mouth every evening.     • Glucosamine-Chondroit-Vit C-Mn (GLUCOSAMINE 1500 COMPLEX PO) Take 1 Tab by mouth 2 Times a Day.     • Magnesium 125 MG Cap Take 1 Cap by mouth 2 Times a Day.     • MELATONIN PO Take 20 mg by mouth every bedtime.     • aspirin 81 MG tablet Take 81 mg by mouth every day.     • sucralfate (CARAFATE) 1 GM TABS Take 1 g by mouth 4 times a day.     • estradiol (VIVELLE DOT) 0.05 MG/24HR PTTW Apply 1 Patch to skin as directed 2 times a week. Use one patch two times per week.      • acetaZOLAMIDE (DIAMOX) 250 MG TABS Take 250 mg by mouth 2 times a day.     • hydrocodone/acetaminophen (NORCO)  MG TABS Take 1 Tab by mouth every 6 hours as needed.     • Calcium Carbonate-Vitamin D (CALCIUM + D PO) Take 1 Tab by mouth 2 Times a Day.     • mycophenolate (CELLCEPT) 250 MG CAPS Take 750 mg by mouth 2 times a day. Indications: Body's Rejection of a Transplanted Organ     • tacrolimus (PROGRAF) 1 MG CAPS Take 1 mg by mouth 2 times a day. Indications: Body's Rejection of a Transplanted Organ     • progesterone (PROMETRIUM) 100 MG CAPS Take 100 mg by mouth every evening.     • sumatriptan (IMITREX) 50 MG TABS Take 100 mg by mouth Once PRN.       No current facility-administered medications for this encounter.        Allergies   Allergen Reactions   • Codeine Vomiting       Physical Exam   Constitutional: She is oriented to person, place, and time. No distress.   Thin appearing   HENT:   Head: Normocephalic.   Eyes: Pupils are equal, round, and reactive to light. No scleral icterus.   Pulmonary/Chest: Effort normal. No respiratory distress.   Abdominal: Soft. Bowel sounds are normal. She exhibits no distension.   No ascites noted   Neurological: She is alert and oriented to person, place, and time. She has normal sensation and normal strength. She is not agitated and not disoriented. She displays no weakness, no tremor, facial symmetry, normal stance and normal speech. No  cranial nerve deficit. Gait normal. Coordination and gait normal.   Skin: Skin is warm and dry. No rash noted. She is not diaphoretic. No erythema. No pallor.   Psychiatric: Mood, memory, affect and judgment normal.     Impression:        1. Unruptured right superior hypophyseal artery aneurysm, 7 mm in size with recurrence, status post Pipeline Flow Diverter placement.        2. Primary biliary cirrhosis, status post liver transplant in 1999.        3. Migraine headaches.        4. Chiari I Malformation, status post decompression in 2008.    Plan:   Aditya Tim MD additionally evaluated the patient today. We discussed the method of the procedure at length and reviewed imaging studies. All questions were answered. We discussed the small possibility of aneurysm recurrence and the need to continue surveillance of the aneurysm. We explained that she is not at any increased risk for development of additional aneurysms. Regarding her complaints of headache, we explained that the aneurysm recurrence was likely unrelated to her presenting symptoms. Additionally, we reviewed her prior imaging studies and explained that the territory of the treated aneurysm is unrelated to the balance and coordination portion of her brain but an MRI could be considered if her symptoms persist. We have planned that the next imaging study will a catheter angiogram performed in one year. Regarding her stomach discomfort: she demonstrated adequate antiplatelet effect while on both clopidogrel and omeprazole on her pre operative TEG and platelet mapping March 15. We asked her to follow up with the provider who switched her from omeprazole to discuss that pantoprazole does not seem to be effective for symptom management and it is important for her GI symptoms to be controlled while on a 6 month course of clopidogrel and aspirin. She does not have a contraindication to concurrent PPI therapy from our standpoint.       MARTA Ramsey  with Aditya Tim MD  Neuro Interventional Service   [address] ([id number])  [place] [address]  [phone]

## 2019-04-12 NOTE — ADDENDUM NOTE
Encounter addended by: Barbara Mayorga on: 4/12/2019 11:38 AM<BR>    Actions taken: Order list changed, MAR administration accepted

## 2019-04-29 ENCOUNTER — TELEPHONE (OUTPATIENT)
Dept: OPHTHALMOLOGY | Facility: MEDICAL CENTER | Age: 64
End: 2019-04-29

## 2019-05-02 ENCOUNTER — TELEPHONE (OUTPATIENT)
Dept: OPHTHALMOLOGY | Facility: MEDICAL CENTER | Age: 64
End: 2019-05-02

## 2019-05-07 ENCOUNTER — TELEPHONE (OUTPATIENT)
Dept: OPHTHALMOLOGY | Facility: MEDICAL CENTER | Age: 64
End: 2019-05-07

## 2019-05-30 ENCOUNTER — HOSPITAL ENCOUNTER (OUTPATIENT)
Dept: LAB | Facility: MEDICAL CENTER | Age: 64
End: 2019-05-30
Attending: INTERNAL MEDICINE
Payer: COMMERCIAL

## 2019-05-30 LAB
ALBUMIN SERPL BCP-MCNC: 4.3 G/DL (ref 3.2–4.9)
ALBUMIN/GLOB SERPL: 1.8 G/DL
ALP SERPL-CCNC: 55 U/L (ref 30–99)
ALT SERPL-CCNC: 17 U/L (ref 2–50)
ANION GAP SERPL CALC-SCNC: 8 MMOL/L (ref 0–11.9)
AST SERPL-CCNC: 18 U/L (ref 12–45)
BASOPHILS # BLD AUTO: 0.9 % (ref 0–1.8)
BASOPHILS # BLD: 0.06 K/UL (ref 0–0.12)
BILIRUB SERPL-MCNC: 0.5 MG/DL (ref 0.1–1.5)
BUN SERPL-MCNC: 16 MG/DL (ref 8–22)
CALCIUM SERPL-MCNC: 8.7 MG/DL (ref 8.5–10.5)
CHLORIDE SERPL-SCNC: 113 MMOL/L (ref 96–112)
CO2 SERPL-SCNC: 21 MMOL/L (ref 20–33)
CREAT SERPL-MCNC: 0.96 MG/DL (ref 0.5–1.4)
EOSINOPHIL # BLD AUTO: 0.29 K/UL (ref 0–0.51)
EOSINOPHIL NFR BLD: 4.4 % (ref 0–6.9)
ERYTHROCYTE [DISTWIDTH] IN BLOOD BY AUTOMATED COUNT: 53.1 FL (ref 35.9–50)
GLOBULIN SER CALC-MCNC: 2.4 G/DL (ref 1.9–3.5)
GLUCOSE SERPL-MCNC: 81 MG/DL (ref 65–99)
HCT VFR BLD AUTO: 40 % (ref 37–47)
HGB BLD-MCNC: 12.4 G/DL (ref 12–16)
IMM GRANULOCYTES # BLD AUTO: 0.02 K/UL (ref 0–0.11)
IMM GRANULOCYTES NFR BLD AUTO: 0.3 % (ref 0–0.9)
LYMPHOCYTES # BLD AUTO: 1.73 K/UL (ref 1–4.8)
LYMPHOCYTES NFR BLD: 26.2 % (ref 22–41)
MCH RBC QN AUTO: 33.5 PG (ref 27–33)
MCHC RBC AUTO-ENTMCNC: 31 G/DL (ref 33.6–35)
MCV RBC AUTO: 108.1 FL (ref 81.4–97.8)
MONOCYTES # BLD AUTO: 0.64 K/UL (ref 0–0.85)
MONOCYTES NFR BLD AUTO: 9.7 % (ref 0–13.4)
NEUTROPHILS # BLD AUTO: 3.86 K/UL (ref 2–7.15)
NEUTROPHILS NFR BLD: 58.5 % (ref 44–72)
NRBC # BLD AUTO: 0 K/UL
NRBC BLD-RTO: 0 /100 WBC
PLATELET # BLD AUTO: 230 K/UL (ref 164–446)
PMV BLD AUTO: 10.4 FL (ref 9–12.9)
POTASSIUM SERPL-SCNC: 3.5 MMOL/L (ref 3.6–5.5)
PROT SERPL-MCNC: 6.7 G/DL (ref 6–8.2)
RBC # BLD AUTO: 3.7 M/UL (ref 4.2–5.4)
SODIUM SERPL-SCNC: 142 MMOL/L (ref 135–145)
WBC # BLD AUTO: 6.6 K/UL (ref 4.8–10.8)

## 2019-05-30 PROCEDURE — 85025 COMPLETE CBC W/AUTO DIFF WBC: CPT

## 2019-05-30 PROCEDURE — 36415 COLL VENOUS BLD VENIPUNCTURE: CPT

## 2019-05-30 PROCEDURE — 80053 COMPREHEN METABOLIC PANEL: CPT

## 2019-05-30 PROCEDURE — 80197 ASSAY OF TACROLIMUS: CPT

## 2019-06-01 LAB — TACROLIMUS BLD-MCNC: 4.9 NG/ML

## 2019-07-16 ENCOUNTER — OFFICE VISIT (OUTPATIENT)
Dept: OPHTHALMOLOGY | Facility: MEDICAL CENTER | Age: 64
End: 2019-07-16
Payer: COMMERCIAL

## 2019-07-16 DIAGNOSIS — H35.81 MACULAR EDEMA: ICD-10-CM

## 2019-07-16 DIAGNOSIS — H25.11 AGE-RELATED NUCLEAR CATARACT OF RIGHT EYE: ICD-10-CM

## 2019-07-16 DIAGNOSIS — H47.10 PAPILLEDEMA: ICD-10-CM

## 2019-07-16 DIAGNOSIS — I67.1 RIGHT INTERNAL CAROTID ARTERY ANEURYSM: ICD-10-CM

## 2019-07-16 DIAGNOSIS — H47.11 PAPILLEDEMA ASSOCIATED WITH INCREASED INTRACRANIAL PRESSURE: ICD-10-CM

## 2019-07-16 PROCEDURE — 92133 CPTRZD OPH DX IMG PST SGM ON: CPT | Mod: 59 | Performed by: OPHTHALMOLOGY

## 2019-07-16 PROCEDURE — 99204 OFFICE O/P NEW MOD 45 MIN: CPT | Mod: 25 | Performed by: OPHTHALMOLOGY

## 2019-07-16 PROCEDURE — 92134 CPTRZ OPH DX IMG PST SGM RTA: CPT | Performed by: OPHTHALMOLOGY

## 2019-07-16 RX ORDER — PHENYLEPHRINE HYDROCHLORIDE 25 MG/ML
1 SOLUTION/ DROPS OPHTHALMIC ONCE
Status: COMPLETED | OUTPATIENT
Start: 2019-07-16 | End: 2019-07-16

## 2019-07-16 RX ORDER — TROPICAMIDE 10 MG/ML
1 SOLUTION/ DROPS OPHTHALMIC ONCE
Status: COMPLETED | OUTPATIENT
Start: 2019-07-16 | End: 2019-07-16

## 2019-07-16 RX ADMIN — PHENYLEPHRINE HYDROCHLORIDE 1 DROP: 25 SOLUTION/ DROPS OPHTHALMIC at 10:26

## 2019-07-16 RX ADMIN — TROPICAMIDE 1 DROP: 10 SOLUTION/ DROPS OPHTHALMIC at 10:26

## 2019-07-16 ASSESSMENT — ENCOUNTER SYMPTOMS
HEADACHES: 1
EYE DISCHARGE: 1
DEPRESSION: 1
BLURRED VISION: 1

## 2019-07-16 ASSESSMENT — REFRACTION
OD_AXIS: 149
OD_CYLINDER: +1.00
OS_AXIS: 134
OD_SPHERE: -0.50
OS_SPHERE: -1.25
OS_CYLINDER: +1.00

## 2019-07-16 ASSESSMENT — REFRACTION_WEARINGRX
OS_CYLINDER: SPHERE
OS_SPHERE: +2.50
OD_SPHERE: +2.50
OD_CYLINDER: SPHERE

## 2019-07-16 ASSESSMENT — KERATOMETRY
OS_AXISANGLE_DEGREES: 165
OS_K1POWER_DIOPTERS: 40.75
OS_AXISANGLE2_DEGREES: 74
OS_K2POWER_DIOPTERS: 41.0

## 2019-07-16 ASSESSMENT — VISUAL ACUITY
OS_SC: 20/40
OS_PH_SC: 20/30
METHOD: SNELLEN - LINEAR
OD_SC: 20/40

## 2019-07-16 ASSESSMENT — CONF VISUAL FIELD
OS_NORMAL: 1
OD_NORMAL: 1

## 2019-07-16 ASSESSMENT — EXTERNAL EXAM - RIGHT EYE: OD_EXAM: NORMAL

## 2019-07-16 ASSESSMENT — REFRACTION_MANIFEST
OS_CYLINDER: +0.75
OS_SPHERE: -1.25
OD_CYLINDER: +0.75
OS_AXIS: 116
OD_AXIS: 152
METHOD_AUTOREFRACTION: 1
OD_SPHERE: -0.25

## 2019-07-16 ASSESSMENT — TONOMETRY
OS_IOP_MMHG: 17
IOP_METHOD: ICARE
OD_IOP_MMHG: 15

## 2019-07-16 ASSESSMENT — SLIT LAMP EXAM - LIDS
COMMENTS: NORMAL
COMMENTS: NORMAL

## 2019-07-16 ASSESSMENT — EXTERNAL EXAM - LEFT EYE: OS_EXAM: NORMAL

## 2019-07-16 ASSESSMENT — CUP TO DISC RATIO
OD_RATIO: 0.3
OS_RATIO: 0.3

## 2019-07-16 NOTE — ASSESSMENT & PLAN NOTE
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 Is currently taking Diamox 625 mg per day for al least the past 10 years. Today I see no evidence of papilledema. Her NFL thickness is normal as well as the ganglion cell, so there is also no evidence that any papilledema in the past caused permanent nerve changes. She inquired as to wether she could taper or stop the Diamox, but I discussed that my concern is that her current anti-rejection medications for her liver transplant and her blood pressure could be affected if she were to discontinue the diamox. Therefore recommended that she discuss with the transplant team. If they agree to wean, then my recommendation would be to do so slowly over at least 6 months to even a year with frequent Neuro-ophthalmological and OCT examinations to rule out the re-development of nerve swelling.

## 2019-07-16 NOTE — ASSESSMENT & PLAN NOTE
7/16/2019 - In regards to her continued blurry vision in the left eye I think that it is multifactorial. Her 5-Line OCT was normal, so there does not appear to be a maculopathy. She does have some uncorrected against the rule astigmatism that may be contributing. It appears as though the astigmatism is lenticular since her K readings did not demonstrate significant corneal astigmatism. 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 Thiss may also explain the fact that she had anisometropia following the injury as well as more predisposed to earlier cataract development in that eye. She also has some PCO as well. Under binocular conditions she may have switched her dominance now to the left eye, thus leading to the complaint of no significant improvement in binocular vision following cataract extraction. Thus recommended that she continue as planned with cataract extraction of the right eye. Then under binocular conditions she might do better. 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

## 2019-07-16 NOTE — PROGRESS NOTES
Peds/Neuro Ophthalmology:   Morteza Lewis M.D.    Date & Time note created:    7/16/2019   2:26 PM     Referring MD / APRN:  Fred Mccain M.D., No att. providers found    Patient ID:  Name:             Yane Mendoza   YOB: 1955  Age:                 64 y.o.  female   MRN:               [id number]    Chief Complaint/Reason for Visit:     Other (decreased vision )      History of Present Illness:    Yane Mendoza is a 64 y.o. female   Hx of double vision  2012 and found out she had brain aneurysm.she had 2 surgery and double vision went away.Cataract removed 5- by  and vision did not improve.eyes tear but no pain.    Very complex patient presenting for opinion of reason for blurry vision. Has a history of some sort of eye injury as a child. Did not require surgery, but possible lens involvement because after the accident she states that had to wear a contact lens only in the left eye or glasses with only an rx in the left eye. At some point she also had lasik in the left eye. Then began having headache and was discovered to have a Chiari 1 malformation and underwent suboccipital craniotomy in 2008., She was also discovered to have a right ICA aneurysm that was coiled intracranially. She also is sp liver transplant. At some -point she was given a diagnosis of IIH by Dr Alonzo and had a LP with an opening pressure of 104. She has been on diamox since. 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 The MRV was at [facility] and dates 2012. More recently because of visual blurring and headaches she had a follow up scans that revealed recurrence of the aneurysm and underwent repeat coiling from the endovascular approach. However she still had blurring and was evaluated by Dr Bernabe. She underwent cataract extraction, but states that the vision did not improve and if anything is slightly worse. There was no sudden change to the vision  and she does not complain of TVO.       Other   Associated symptoms include headaches.       Review of Systems:  Review of Systems   HENT: Positive for tinnitus.    Eyes: Positive for blurred vision and discharge.   Neurological: Positive for headaches.   Psychiatric/Behavioral: Positive for depression.   All other systems reviewed and are negative.      Past Medical History:   Past Medical History:   Diagnosis Date   • Anesthesia     ponv   • Cataracts, bilateral    • H/O liver transplant (HCC)    • Jaundice 1999    PBC   • Migraine headache    • PONV (postoperative nausea and vomiting)     Sometimes a headache (migraine).   • Primary biliary cirrhosis (HCC)     s/p liver transplant 1999   • Right internal carotid artery aneurysm    • Snoring        Past Surgical History:  Past Surgical History:   Procedure Laterality Date   • RECOVERY  6/4/2012    Performed by SURGERY, IR[facility]RECOVERY at [facility]   • PB SUBOCCIPT DECOMP MEDULLA/SP CRD  9/4/2008    [place], [place]   • PB TRANSPLANT LIVER,ALLOTRANSPLANT  5/30/1999    [place], [place]   • EYE SURGERY Left     Cataract extraction wih IOL   • EYE SURGERY Left     LASIK       Current Outpatient Medications:  Current Outpatient Prescriptions   Medication Sig Dispense Refill   • zolpidem (AMBIEN) 10 MG Tab Take 15-20 mg by mouth at bedtime as needed for Sleep.     • pantoprazole (PROTONIX) 40 MG Tablet Delayed Response Take 40 mg by mouth every day.     • clopidogrel (PLAVIX) 75 MG Tab Take 75 mg by mouth every day.     • acyclovir (ZOVIRAX) 800 MG Tab Take 800 mg by mouth 4 times a day.     • vitamin D (CHOLECALCIFEROL) 1000 UNIT Tab Take 1,000 Units by mouth every evening.     • Glucosamine-Chondroit-Vit C-Mn (GLUCOSAMINE 1500 COMPLEX PO) Take 1 Tab by mouth 2 Times a Day.     • Magnesium 125 MG Cap Take 1 Cap by mouth 2 Times a Day.     • MELATONIN PO Take 20 mg by mouth every bedtime.     • aspirin 81 MG tablet Take 81 mg by mouth every day.     •  sucralfate (CARAFATE) 1 GM TABS Take 1 g by mouth 4 times a day.     • estradiol (VIVELLE DOT) 0.05 MG/24HR PTTW Apply 1 Patch to skin as directed 2 times a week. Use one patch two times per week.      • acetaZOLAMIDE (DIAMOX) 250 MG TABS Take 250 mg by mouth 2 times a day.     • hydrocodone/acetaminophen (NORCO)  MG TABS Take 1 Tab by mouth every 6 hours as needed.     • Calcium Carbonate-Vitamin D (CALCIUM + D PO) Take 1 Tab by mouth 2 Times a Day.     • mycophenolate (CELLCEPT) 250 MG CAPS Take 750 mg by mouth 2 times a day. Indications: Body's Rejection of a Transplanted Organ     • tacrolimus (PROGRAF) 1 MG CAPS Take 1 mg by mouth 2 times a day. Indications: Body's Rejection of a Transplanted Organ     • progesterone (PROMETRIUM) 100 MG CAPS Take 100 mg by mouth every evening.     • sumatriptan (IMITREX) 50 MG TABS Take 100 mg by mouth Once PRN.     • terbinafine (LAMISIL) 250 MG Tab Take 1 Tab by mouth See Admin Instructions. TAKE 1 TABLET BY MOUTH DAILY x 7 days of month (1st-7th)  2     No current facility-administered medications for this visit.        Allergies:  Allergies   Allergen Reactions   • Codeine Vomiting       Family History:  Family History   Problem Relation Age of Onset   • Cancer Mother    • Heart Disease Father    • Cancer Father    • Heart Disease Brother    • Cancer Brother        Social History:  Social History     Social History   • Marital status:      Spouse name: N/A   • Number of children: N/A   • Years of education: N/A     Occupational History   • Not on file.     Social History Main Topics   • Smoking status: Never Smoker   • Smokeless tobacco: Never Used   • Alcohol use No   • Drug use: No   • Sexual activity: Not on file     Other Topics Concern   • Not on file     Social History Narrative    63 yo female retired.          Physical Exam:  Physical Exam    Oriented x 3  Weight/BMI: There is no height or weight on file to calculate BMI.  There were no vitals taken for  this visit.    Base Eye Exam     Visual Acuity (Snellen - Linear)       Right Left    Dist sc 20/40 20/40    Dist ph sc NI 20/30          Tonometry (icare, 9:45 AM)       Right Left    Pressure 15 17          Pupils       Pupils    Right PERRL    Left PERRL          Visual Fields       Right Left     Full Full          Neuro/Psych     Oriented x3:  Yes    Mood/Affect:  Normal          Dilation     Both eyes:  Tropicamide (MYDRIACYL) 1% ophthalmic solution, Phenylephrine (NEOSYNEPHRINE) ophthalmic solution 2.5% @ 10:24 AM            Additional Tests     Color       Right Left    Ishihara 10/10 10/10          Keratometry       K1 Axis K2 Axis    Right        Left 40.75 74 41.0 165          Stereo     Fly:  +    Animals:  3/3    Circles:  9/9            Strabismus Exam     Correction:  sc    Distance Near Near +3DS N Bifocals    X(T) 2 X(T)' 4               0 0 0   0 0 0                      X(T) 2 0  0  X(T) 2 0  0  X(T) 2                     0 0 0   0 0 0                   Slit Lamp and Fundus Exam     External Exam       Right Left    External Normal Normal          Slit Lamp Exam       Right Left    Lids/Lashes Normal Normal    Conjunctiva/Sclera White and quiet White and quiet    Cornea Clear Clear    Anterior Chamber Deep and quiet Deep and quiet    Iris Round and reactive Round and reactive    Lens Clear Posterior chamber intraocular lens, Posterior capsular opacification    Vitreous Normal Normal          Fundus Exam       Right Left    Disc Normal, ? Old high water livia Normal, ? old high water livia    C/D Ratio 0.3 0.3    Macula Normal Normal    Vessels Normal Normal    Periphery Normal Normal            Refraction     Wearing Rx       Sphere Cylinder    Right +2.50 Sphere    Left +2.50 Sphere          Manifest Refraction (Auto)       Sphere Cylinder Axis    Right -0.25 +0.75 152    Left -1.25 +0.75 116          Cycloplegic Refraction (Auto)       Sphere Cylinder Axis    Right -0.50 +1.00 149    Left -1.25  +1.00 134            Contact Lens Exam     Keratometry       K1 Axis K2 Axis    Right        Left 40.75 74 41.0 165                Pertinent Lab/Test/Imaging Review:      Assessment and Plan:     Papilledema  7/16/2019 - Very complex history of possible IIH in the past sp suboccipital decompression for Chiari and narrowing of the transverse sinus on MRV. Is currently taking Diamox 625 mg per day for al least the past 10 years. Today I see no evidence of papilledema. Her NFL thickness is normal as well as the ganglion cell, so there is also no evidence that any papilledema in the past caused permanent nerve changes. She inquired as to wether she could taper or stop the Diamox, but I discussed that my concern is that her current anti-rejection medications for her liver transplant and her blood pressure could be affected if she were to discontinue the diamox. Therefore recommended that she discuss with the transplant team. If they agree to wean, then my recommendation would be to do so slowly over at least 6 months to even a year with frequent Neuro-ophthalmological and OCT examinations to rule out the re-development of nerve swelling.     Age-related nuclear cataract of right eye  7/16/2019 - In regards to her continued blurry vision in the left eye I think that it is multifactorial. Her 5-Line OCT was normal, so there does not appear to be a maculopathy. She does have some uncorrected against the rule astigmatism that may be contributing. It appears as though the astigmatism is lenticular since her K readings did not demonstrate significant corneal astigmatism. 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 Thiss may also explain the fact that she had anisometropia following the injury as well as more predisposed to earlier cataract development in that  eye. She also has some PCO as well. Under binocular conditions she may have switched her dominance now to the left eye, thus leading to the complaint of no significant improvement in binocular vision following cataract extraction. Thus recommended that she continue as planned with cataract extraction of the right eye. Then under binocular conditions she might do better. 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     Right internal carotid artery aneurysm  7/16/2019 - Aneurysm does not appear to be in location that would affect the visual pathways.         Morteza Lewis M.D.

## 2019-11-14 ENCOUNTER — HOSPITAL ENCOUNTER (OUTPATIENT)
Dept: LAB | Facility: MEDICAL CENTER | Age: 64
End: 2019-11-14
Attending: INTERNAL MEDICINE
Payer: COMMERCIAL

## 2019-11-14 LAB
ALBUMIN SERPL BCP-MCNC: 4.1 G/DL (ref 3.2–4.9)
ALBUMIN/GLOB SERPL: 1.6 G/DL
ALP SERPL-CCNC: 67 U/L (ref 30–99)
ALT SERPL-CCNC: 13 U/L (ref 2–50)
ANION GAP SERPL CALC-SCNC: 9 MMOL/L (ref 0–11.9)
AST SERPL-CCNC: 15 U/L (ref 12–45)
BASOPHILS # BLD AUTO: 0.5 % (ref 0–1.8)
BASOPHILS # BLD: 0.03 K/UL (ref 0–0.12)
BILIRUB SERPL-MCNC: 0.5 MG/DL (ref 0.1–1.5)
BUN SERPL-MCNC: 17 MG/DL (ref 8–22)
CALCIUM SERPL-MCNC: 8.9 MG/DL (ref 8.5–10.5)
CHLORIDE SERPL-SCNC: 111 MMOL/L (ref 96–112)
CO2 SERPL-SCNC: 21 MMOL/L (ref 20–33)
CREAT SERPL-MCNC: 0.9 MG/DL (ref 0.5–1.4)
EOSINOPHIL # BLD AUTO: 0.13 K/UL (ref 0–0.51)
EOSINOPHIL NFR BLD: 2.2 % (ref 0–6.9)
ERYTHROCYTE [DISTWIDTH] IN BLOOD BY AUTOMATED COUNT: 52.1 FL (ref 35.9–50)
GLOBULIN SER CALC-MCNC: 2.6 G/DL (ref 1.9–3.5)
GLUCOSE SERPL-MCNC: 71 MG/DL (ref 65–99)
HCT VFR BLD AUTO: 41.3 % (ref 37–47)
HGB BLD-MCNC: 12.9 G/DL (ref 12–16)
IMM GRANULOCYTES # BLD AUTO: 0.01 K/UL (ref 0–0.11)
IMM GRANULOCYTES NFR BLD AUTO: 0.2 % (ref 0–0.9)
LYMPHOCYTES # BLD AUTO: 1.79 K/UL (ref 1–4.8)
LYMPHOCYTES NFR BLD: 30.8 % (ref 22–41)
MCH RBC QN AUTO: 33.8 PG (ref 27–33)
MCHC RBC AUTO-ENTMCNC: 31.2 G/DL (ref 33.6–35)
MCV RBC AUTO: 108.1 FL (ref 81.4–97.8)
MONOCYTES # BLD AUTO: 0.59 K/UL (ref 0–0.85)
MONOCYTES NFR BLD AUTO: 10.2 % (ref 0–13.4)
NEUTROPHILS # BLD AUTO: 3.26 K/UL (ref 2–7.15)
NEUTROPHILS NFR BLD: 56.1 % (ref 44–72)
NRBC # BLD AUTO: 0 K/UL
NRBC BLD-RTO: 0 /100 WBC
PLATELET # BLD AUTO: 239 K/UL (ref 164–446)
PMV BLD AUTO: 10.6 FL (ref 9–12.9)
POTASSIUM SERPL-SCNC: 4 MMOL/L (ref 3.6–5.5)
PROT SERPL-MCNC: 6.7 G/DL (ref 6–8.2)
RBC # BLD AUTO: 3.82 M/UL (ref 4.2–5.4)
SODIUM SERPL-SCNC: 141 MMOL/L (ref 135–145)
WBC # BLD AUTO: 5.8 K/UL (ref 4.8–10.8)

## 2019-11-14 PROCEDURE — 80053 COMPREHEN METABOLIC PANEL: CPT

## 2019-11-14 PROCEDURE — 36415 COLL VENOUS BLD VENIPUNCTURE: CPT

## 2019-11-14 PROCEDURE — 80197 ASSAY OF TACROLIMUS: CPT

## 2019-11-14 PROCEDURE — 85025 COMPLETE CBC W/AUTO DIFF WBC: CPT

## 2019-11-16 LAB — TACROLIMUS BLD-MCNC: 5.3 NG/ML

## 2019-11-25 RX ORDER — ACETAZOLAMIDE 250 MG/1
250 TABLET ORAL 2 TIMES DAILY
Qty: 90 TAB | Refills: 3 | Status: SHIPPED | OUTPATIENT
Start: 2019-11-25 | End: 2019-12-25

## 2020-01-09 ENCOUNTER — HOSPITAL ENCOUNTER (OUTPATIENT)
Dept: RADIOLOGY | Facility: MEDICAL CENTER | Age: 65
End: 2020-01-09
Attending: SPECIALIST
Payer: COMMERCIAL

## 2020-01-09 DIAGNOSIS — N63.10 LUMP OF RIGHT BREAST: ICD-10-CM

## 2020-01-09 PROCEDURE — G0279 TOMOSYNTHESIS, MAMMO: HCPCS

## 2020-01-09 PROCEDURE — 76642 ULTRASOUND BREAST LIMITED: CPT | Mod: RT

## 2020-01-10 ENCOUNTER — HOSPITAL ENCOUNTER (OUTPATIENT)
Dept: RADIOLOGY | Facility: MEDICAL CENTER | Age: 65
End: 2020-01-10
Attending: SPECIALIST
Payer: COMMERCIAL

## 2020-01-10 DIAGNOSIS — R92.8 ABNORMAL FINDING ON BREAST IMAGING: ICD-10-CM

## 2020-01-10 PROCEDURE — 19083 BX BREAST 1ST LESION US IMAG: CPT

## 2020-01-10 PROCEDURE — 88305 TISSUE EXAM BY PATHOLOGIST: CPT

## 2020-01-11 LAB — PATHOLOGY CONSULT NOTE: NORMAL

## 2020-01-13 ENCOUNTER — TELEPHONE (OUTPATIENT)
Dept: RADIOLOGY | Facility: MEDICAL CENTER | Age: 65
End: 2020-01-13

## 2020-01-20 ENCOUNTER — OFFICE VISIT (OUTPATIENT)
Dept: OPHTHALMOLOGY | Facility: MEDICAL CENTER | Age: 65
End: 2020-01-20
Payer: COMMERCIAL

## 2020-01-20 DIAGNOSIS — H47.10 PAPILLEDEMA: ICD-10-CM

## 2020-01-20 DIAGNOSIS — H25.11 AGE-RELATED NUCLEAR CATARACT OF RIGHT EYE: ICD-10-CM

## 2020-01-20 PROCEDURE — 92012 INTRM OPH EXAM EST PATIENT: CPT | Mod: 25 | Performed by: OPHTHALMOLOGY

## 2020-01-20 PROCEDURE — 92133 CPTRZD OPH DX IMG PST SGM ON: CPT | Performed by: OPHTHALMOLOGY

## 2020-01-20 RX ORDER — ACETAZOLAMIDE 250 MG/1
250-500 TABLET ORAL 3 TIMES DAILY
Qty: 90 TAB | Refills: 12 | Status: SHIPPED | OUTPATIENT
Start: 2020-01-20 | End: 2020-04-28 | Stop reason: SDUPTHER

## 2020-01-20 RX ORDER — OMEPRAZOLE 20 MG/1
20 CAPSULE, DELAYED RELEASE ORAL DAILY
COMMUNITY

## 2020-01-20 ASSESSMENT — REFRACTION_MANIFEST
OS_CYLINDER: +1.75
OS_SPHERE: -2.00
OD_SPHERE: -1.25
OS_CYLINDER: +1.50
OS_ADD: +3.00
OS_AXIS: 169
OD_CYLINDER: +0.75
OS_AXIS: 170
OD_ADD: +3.00
OD_CYLINDER: +0.75
OD_AXIS: 155
OS_SPHERE: -2.00
OD_AXIS: 152
OD_SPHERE: -1.00

## 2020-01-20 ASSESSMENT — VISUAL ACUITY
OD_SC: 20/30-2
OS_SC: J7
OS_SC: 20/80
OD_SC: J7
METHOD: SNELLEN - LINEAR
OS_PH_SC: 20/40

## 2020-01-20 ASSESSMENT — TONOMETRY
OS_IOP_MMHG: 11
OD_IOP_MMHG: 9

## 2020-01-20 ASSESSMENT — CUP TO DISC RATIO
OD_RATIO: 0.3
OS_RATIO: 0.3

## 2020-01-20 ASSESSMENT — ENCOUNTER SYMPTOMS: BLURRED VISION: 1

## 2020-01-20 ASSESSMENT — EXTERNAL EXAM - RIGHT EYE: OD_EXAM: NORMAL

## 2020-01-20 ASSESSMENT — SLIT LAMP EXAM - LIDS
COMMENTS: NORMAL
COMMENTS: NORMAL

## 2020-01-20 ASSESSMENT — CONF VISUAL FIELD
OD_NORMAL: 1
OS_NORMAL: 1

## 2020-01-20 ASSESSMENT — EXTERNAL EXAM - LEFT EYE: OS_EXAM: NORMAL

## 2020-01-20 NOTE — PROGRESS NOTES
Peds/Neuro Ophthalmology:   Morteza Lewis M.D.    Date & Time note created:    1/20/2020   11:49 AM     Referring MD / APRN:  Fred Mccain M.D., No att. providers found    Patient ID:  Name:             Yane Mendoza   YOB: 1955  Age:                 64 y.o.  female   MRN:               [id number]    Chief Complaint/Reason for Visit:     Papilledema      History of Present Illness:    Yane Mendoza is a 64 y.o. female   6 mo FV for papilledema.Pt has had cataract surgery both eyes since last visit.Miltifocal lens in right eye,Multifocal lens left eye replaced with standard lens but pt feels va no better post cataract surgery.Shadowing in vision like someone is turning off the light since cats removed.no eye pain or discomfort. Thinks the vision is about the same. And has a filament to laser in the left eye. Went down to two pills on the diagmox, migraine came back. Then went back up to 2.5 pills per day and the headaches have subsided. No new medication, stating that the BP and liver transplant age going well. Being followed by GI every 6 months.       Review of Systems:  Review of Systems   Eyes: Positive for blurred vision.        Shadowing in vision       Past Medical History:   Past Medical History:   Diagnosis Date   • Anesthesia     ponv   • Cataracts, bilateral    • H/O liver transplant (HCC)    • Jaundice 1999    PBC   • Migraine headache    • Osteoporosis    • PONV (postoperative nausea and vomiting)     Sometimes a headache (migraine).   • Primary biliary cirrhosis (HCC)     s/p liver transplant 1999   • Right internal carotid artery aneurysm    • Snoring        Past Surgical History:  Past Surgical History:   Procedure Laterality Date   • RECOVERY  6/4/2012    Performed by SURGERY, IR-RECOVERY at [facility]   • PB SUBOCCIPT DECOMP MEDULLA/SP CRD  9/4/2008    [place], [place]   • PB TRANSPLANT LIVER,ALLOTRANSPLANT  5/30/1999    [place], [place]   •  CATARACT PHACO WITH IOL Bilateral    • EYE SURGERY Left     Cataract extraction wih IOL   • EYE SURGERY Left     LASIK       Current Outpatient Medications:  Current Outpatient Medications   Medication Sig Dispense Refill   • omeprazole (PRILOSEC) 20 MG delayed-release capsule Take 20 mg by mouth every day.     • artificial tears (EYE LUBRICANT) Ointment ophth ointment Place 1 Application in both eyes every 8 hours.     • acetaZOLAMIDE (DIAMOX) 250 MG Tab Take 1-2 Tabs by mouth 3 times a day. 90 Tab 12   • zolpidem (AMBIEN) 10 MG Tab Take 15-20 mg by mouth at bedtime as needed for Sleep.     • acyclovir (ZOVIRAX) 800 MG Tab Take 800 mg by mouth 4 times a day.     • Glucosamine-Chondroit-Vit C-Mn (GLUCOSAMINE 1500 COMPLEX PO) Take 1 Tab by mouth 2 Times a Day.     • Magnesium 125 MG Cap Take 1 Cap by mouth 2 Times a Day.     • MELATONIN PO Take 20 mg by mouth every bedtime.     • sucralfate (CARAFATE) 1 GM TABS Take 1 g by mouth 4 times a day.     • estradiol (VIVELLE DOT) 0.05 MG/24HR PTTW Apply 1 Patch to skin as directed 2 times a week. Use one patch two times per week.      • hydrocodone/acetaminophen (NORCO)  MG TABS Take 1 Tab by mouth every 6 hours as needed.     • Calcium Carbonate-Vitamin D (CALCIUM + D PO) Take 1 Tab by mouth 2 Times a Day.     • mycophenolate (CELLCEPT) 250 MG CAPS Take 750 mg by mouth 2 times a day. Indications: Body's Rejection of a Transplanted Organ     • tacrolimus (PROGRAF) 1 MG CAPS Take 1 mg by mouth 2 times a day. Indications: Body's Rejection of a Transplanted Organ     • progesterone (PROMETRIUM) 100 MG CAPS Take 100 mg by mouth every evening.     • sumatriptan (IMITREX) 50 MG TABS Take 100 mg by mouth Once PRN.     • terbinafine (LAMISIL) 250 MG Tab Take 1 Tab by mouth See Admin Instructions. TAKE 1 TABLET BY MOUTH DAILY x 7 days of month (1st-7th)  2   • pantoprazole (PROTONIX) 40 MG Tablet Delayed Response Take 40 mg by mouth every day.     • clopidogrel (PLAVIX) 75 MG  Tab Take 75 mg by mouth every day.     • vitamin D (CHOLECALCIFEROL) 1000 UNIT Tab Take 1,000 Units by mouth every evening.     • aspirin 81 MG tablet Take 81 mg by mouth every day.       No current facility-administered medications for this visit.        Allergies:  Allergies   Allergen Reactions   • Codeine Vomiting       Family History:  Family History   Problem Relation Age of Onset   • Cancer Mother    • Heart Disease Father    • Cancer Father    • Heart Disease Brother    • Cancer Brother        Social History:  Social History     Socioeconomic History   • Marital status:      Spouse name: Not on file   • Number of children: Not on file   • Years of education: Not on file   • Highest education level: Not on file   Occupational History   • Not on file   Social Needs   • Financial resource strain: Not on file   • Food insecurity:     Worry: Not on file     Inability: Not on file   • Transportation needs:     Medical: Not on file     Non-medical: Not on file   Tobacco Use   • Smoking status: Never Smoker   • Smokeless tobacco: Never Used   Substance and Sexual Activity   • Alcohol use: No   • Drug use: No   • Sexual activity: Not on file   Lifestyle   • Physical activity:     Days per week: Not on file     Minutes per session: Not on file   • Stress: Not on file   Relationships   • Social connections:     Talks on phone: Not on file     Gets together: Not on file     Attends Orthodoxy service: Not on file     Active member of club or organization: Not on file     Attends meetings of clubs or organizations: Not on file     Relationship status: Not on file   • Intimate partner violence:     Fear of current or ex partner: Not on file     Emotionally abused: Not on file     Physically abused: Not on file     Forced sexual activity: Not on file   Other Topics Concern   • Not on file   Social History Narrative    65 yo female retired.          Physical Exam:  Physical Exam    Oriented x 3  Weight/BMI: There is  no height or weight on file to calculate BMI.  There were no vitals taken for this visit.    Base Eye Exam     Visual Acuity (Snellen - Linear)       Right Left    Dist sc 20/30-2 20/80    Dist ph sc NI 20/40    Near sc J7 J7          Tonometry ( care, 10:56 AM)       Right Left    Pressure 9 11          Pupils       Pupils    Right PERRL    Left PERRL          Visual Fields       Right Left     Full Full          Neuro/Psych     Oriented x3:  Yes    Mood/Affect:  Normal            Additional Tests     Color       Right Left    Ishihara 9/9 9/9          Stereo     Fly:  +    Animals:  3/3    Circles:  9/9            Slit Lamp and Fundus Exam     External Exam       Right Left    External Normal Normal          Slit Lamp Exam       Right Left    Lids/Lashes Normal Normal    Conjunctiva/Sclera White and quiet White and quiet    Cornea Clear Clear    Anterior Chamber Deep and quiet Deep and quiet    Iris Round and reactive Round and reactive    Lens Posterior chamber intraocular lens, Posterior capsular opacification Posterior chamber intraocular lens, Posterior capsular opacification    Vitreous Normal Normal          Fundus Exam       Right Left    Disc Normal, ? Old high water livia Normal, ? old high water livia    C/D Ratio 0.3 0.3    Macula Normal Normal    Vessels Normal Normal    Periphery Normal Normal            Refraction     Manifest Refraction       Sphere Cylinder Axis Dist VA Add    Right -1.25 +0.75 152      Left -2.00 +1.75 169            Manifest Refraction #2       Sphere Cylinder Axis Dist VA Add    Right -1.00 +0.75 155 20/20 +3.00    Left -2.00 +1.50 170 20/25 +3.00          Final Rx       Sphere Cylinder Axis Add    Right -1.00 +0.75 155 +3.00    Left -2.00 +1.50 170 +3.00                Pertinent Lab/Test/Imaging Review:      Assessment and Plan:     Age-related nuclear cataract of right eye  7/16/2019 - In regards to her continued blurry vision in the left eye I think that it is multifactorial.  Her 5-Line OCT was normal, so there does not appear to be a maculopathy. She does have some uncorrected against the rule astigmatism that may be contributing. It appears as though the astigmatism is lenticular since her K readings did not demonstrate significant corneal astigmatism. 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 Thiss may also explain the fact that she had anisometropia following the injury as well as more predisposed to earlier cataract development in that eye. She also has some PCO as well. Under binocular conditions she may have switched her dominance now to the left eye, thus leading to the complaint of no significant improvement in binocular vision following cataract extraction. Thus recommended that she continue as planned with cataract extraction of the right eye. Then under binocular conditions she might do better. 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   1/20/2020 - Got cataract extraction OD and IOL exchange OS. On schedule for YAG. Gave rx to improve acuity that can get post yag.     Papilledema  7/16/2019 - Very complex history of possible IIH in the past sp suboccipital decompression for Chiari and narrowing of the transverse sinus on MRV. Is currently taking Diamox 625 mg per day for al least the past 10 years. Today I see no evidence of papilledema. Her NFL thickness is normal as well as the ganglion cell, so there is also no evidence that any papilledema in the past caused permanent nerve changes. She inquired as to wether she could taper or stop the Diamox, but I discussed that my concern is that her current anti-rejection medications for her liver transplant and her blood pressure could be  affected if she were to discontinue the diamox. Therefore recommended that she discuss with the transplant team. If they agree to wean, then my recommendation would be to do so slowly over at least 6 months to even a year with frequent Neuro-ophthalmological and OCT examinations to rule out the re-development of nerve swelling.   1/20/2020 - Tried taper diamox, but then got headaches back and went back to original does of 2.5 tabs per day. OCT NFL thickness today at 101 OD and 89 OS. Thus discussed since electrolytes are good, that could continue with the current does.         Morteza Lewis M.D.

## 2020-01-20 NOTE — ASSESSMENT & PLAN NOTE
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 Is currently taking Diamox 625 mg per day for al least the past 10 years. Today I see no evidence of papilledema. Her NFL thickness is normal as well as the ganglion cell, so there is also no evidence that any papilledema in the past caused permanent nerve changes. She inquired as to wether she could taper or stop the Diamox, but I discussed that my concern is that her current anti-rejection medications for her liver transplant and her blood pressure could be affected if she were to discontinue the diamox. Therefore recommended that she discuss with the transplant team. If they agree to wean, then my recommendation would be to do so slowly over at least 6 months to even a year with frequent Neuro-ophthalmological and OCT examinations to rule out the re-development of nerve swelling.   1/20/2020 - Tried taper diamox, but then got headaches back and went back to original does of 2.5 tabs per day. OCT NFL thickness today at 101 OD and 89 OS. Thus discussed since electrolytes are good, that could continue with the current does.

## 2020-01-20 NOTE — ASSESSMENT & PLAN NOTE
7/16/2019 - In regards to her continued blurry vision in the left eye I think that it is multifactorial. Her 5-Line OCT was normal, so there does not appear to be a maculopathy. She does have some uncorrected against the rule astigmatism that may be contributing. It appears as though the astigmatism is lenticular since her K readings did not demonstrate significant corneal astigmatism. 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 Thiss may also explain the fact that she had anisometropia following the injury as well as more predisposed to earlier cataract development in that eye. She also has some PCO as well. Under binocular conditions she may have switched her dominance now to the left eye, thus leading to the complaint of no significant improvement in binocular vision following cataract extraction. Thus recommended that she continue as planned with cataract extraction of the right eye. Then under binocular conditions she might do better. 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   1/20/2020 - Got cataract extraction OD and IOL exchange OS. On schedule for YAG. Gave rx to improve acuity that can get post yag.

## 2020-04-28 RX ORDER — ACETAZOLAMIDE 250 MG/1
250-500 TABLET ORAL 3 TIMES DAILY
Qty: 90 TAB | Refills: 12 | Status: SHIPPED | OUTPATIENT
Start: 2020-04-28 | End: 2022-01-25 | Stop reason: SDUPTHER

## 2020-05-20 ENCOUNTER — APPOINTMENT (RX ONLY)
Dept: URBAN - METROPOLITAN AREA CLINIC 4 | Facility: CLINIC | Age: 65
Setting detail: DERMATOLOGY
End: 2020-05-20

## 2020-05-20 PROBLEM — C44.92 SQUAMOUS CELL CARCINOMA OF SKIN, UNSPECIFIED: Status: ACTIVE | Noted: 2020-05-20

## 2020-05-20 PROCEDURE — ? MOHS SURGERY PHONE CONSULTATION

## 2020-05-20 NOTE — PROCEDURE: MOHS SURGERY PHONE CONSULTATION
Referring Provider: Pamela Keller
Detail Level: Simple
Patient Reported Location: left chest
Has The Patient Ever Had A Joint Replaced?: No
Office Location Of Mohs Surgery: [facility]
If Yes- Additional Details: STENT IN BRAIN
Does The Patient Take Antibiotics Prior To Dental Procedures?: Yes
Patient's Insurance: [facility]
Date Of Mohs Surgery: 06/02/2020
Which Antibiotic Do They Take For Surgical Prophylaxis?: Amoxicillin (2 grams)
Pathology Accession #: [id number]
Time Of Mohs Surgery: 11am
Patient Preferred Phone Number: [phone]
If Yes- Additional Details: LIVER 1999

## 2020-05-22 ENCOUNTER — HOSPITAL ENCOUNTER (OUTPATIENT)
Dept: LAB | Facility: MEDICAL CENTER | Age: 65
End: 2020-05-22
Attending: INTERNAL MEDICINE
Payer: COMMERCIAL

## 2020-05-22 LAB
ALBUMIN SERPL BCP-MCNC: 4.1 G/DL (ref 3.2–4.9)
ALBUMIN/GLOB SERPL: 1.5 G/DL
ALP SERPL-CCNC: 71 U/L (ref 30–99)
ALT SERPL-CCNC: 9 U/L (ref 2–50)
ANION GAP SERPL CALC-SCNC: 13 MMOL/L (ref 7–16)
AST SERPL-CCNC: 12 U/L (ref 12–45)
BASOPHILS # BLD AUTO: 0.8 % (ref 0–1.8)
BASOPHILS # BLD: 0.05 K/UL (ref 0–0.12)
BILIRUB SERPL-MCNC: 0.5 MG/DL (ref 0.1–1.5)
BUN SERPL-MCNC: 14 MG/DL (ref 8–22)
CALCIUM SERPL-MCNC: 9.2 MG/DL (ref 8.5–10.5)
CHLORIDE SERPL-SCNC: 110 MMOL/L (ref 96–112)
CO2 SERPL-SCNC: 19 MMOL/L (ref 20–33)
CREAT SERPL-MCNC: 0.82 MG/DL (ref 0.5–1.4)
EOSINOPHIL # BLD AUTO: 0.15 K/UL (ref 0–0.51)
EOSINOPHIL NFR BLD: 2.4 % (ref 0–6.9)
ERYTHROCYTE [DISTWIDTH] IN BLOOD BY AUTOMATED COUNT: 52.6 FL (ref 35.9–50)
GLOBULIN SER CALC-MCNC: 2.8 G/DL (ref 1.9–3.5)
GLUCOSE SERPL-MCNC: 87 MG/DL (ref 65–99)
HCT VFR BLD AUTO: 41.6 % (ref 37–47)
HGB BLD-MCNC: 12.8 G/DL (ref 12–16)
IMM GRANULOCYTES # BLD AUTO: 0.02 K/UL (ref 0–0.11)
IMM GRANULOCYTES NFR BLD AUTO: 0.3 % (ref 0–0.9)
LYMPHOCYTES # BLD AUTO: 1.67 K/UL (ref 1–4.8)
LYMPHOCYTES NFR BLD: 27.1 % (ref 22–41)
MCH RBC QN AUTO: 33.3 PG (ref 27–33)
MCHC RBC AUTO-ENTMCNC: 30.8 G/DL (ref 33.6–35)
MCV RBC AUTO: 108.3 FL (ref 81.4–97.8)
MONOCYTES # BLD AUTO: 0.68 K/UL (ref 0–0.85)
MONOCYTES NFR BLD AUTO: 11 % (ref 0–13.4)
NEUTROPHILS # BLD AUTO: 3.6 K/UL (ref 2–7.15)
NEUTROPHILS NFR BLD: 58.4 % (ref 44–72)
NRBC # BLD AUTO: 0 K/UL
NRBC BLD-RTO: 0 /100 WBC
PLATELET # BLD AUTO: 246 K/UL (ref 164–446)
PMV BLD AUTO: 10.5 FL (ref 9–12.9)
POTASSIUM SERPL-SCNC: 3.9 MMOL/L (ref 3.6–5.5)
PROT SERPL-MCNC: 6.9 G/DL (ref 6–8.2)
RBC # BLD AUTO: 3.84 M/UL (ref 4.2–5.4)
SODIUM SERPL-SCNC: 142 MMOL/L (ref 135–145)
WBC # BLD AUTO: 6.2 K/UL (ref 4.8–10.8)

## 2020-05-22 PROCEDURE — 36415 COLL VENOUS BLD VENIPUNCTURE: CPT

## 2020-05-22 PROCEDURE — 85025 COMPLETE CBC W/AUTO DIFF WBC: CPT

## 2020-05-22 PROCEDURE — 80197 ASSAY OF TACROLIMUS: CPT

## 2020-05-22 PROCEDURE — 80053 COMPREHEN METABOLIC PANEL: CPT

## 2020-05-24 LAB — TACROLIMUS BLD-MCNC: 6.2 NG/ML

## 2020-05-28 ENCOUNTER — RX ONLY (OUTPATIENT)
Age: 65
Setting detail: RX ONLY
End: 2020-05-28

## 2020-06-02 ENCOUNTER — APPOINTMENT (RX ONLY)
Dept: URBAN - METROPOLITAN AREA CLINIC 36 | Facility: CLINIC | Age: 65
Setting detail: DERMATOLOGY
End: 2020-06-02

## 2020-06-02 PROBLEM — C44.529 SQUAMOUS CELL CARCINOMA OF SKIN OF OTHER PART OF TRUNK: Status: ACTIVE | Noted: 2020-06-02

## 2020-06-02 PROCEDURE — 17313 MOHS 1 STAGE T/A/L: CPT

## 2020-06-02 PROCEDURE — ? PRESCRIPTION

## 2020-06-02 PROCEDURE — 17314 MOHS ADDL STAGE T/A/L: CPT

## 2020-06-02 PROCEDURE — 13101 CMPLX RPR TRUNK 2.6-7.5 CM: CPT

## 2020-06-02 PROCEDURE — ? MOHS SURGERY

## 2020-06-02 NOTE — HPI: PROCEDURE (MOHS)
Has The Growth Been Previously Biopsied?: has been previously biopsied
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
Body Location Override (Optional): Left chest
Family Member: Brother

## 2020-06-02 NOTE — PROCEDURE: MOHS SURGERY
Inflammation Suggestive Of Cancer Camouflage Histology Text: There was a dense lymphocytic infiltrate which prevented adequate histologic evaluation of adjacent structures.
Special Stains Stage 4 - Results: Base On Clearance Noted Above
Melolabial Interpolation Flap Text: A decision was made to reconstruct the defect utilizing an interpolation axial flap and a staged reconstruction.  A telfa template was made of the defect.  This telfa template was then used to outline the melolabial interpolation flap.  The donor area for the pedicle flap was then injected with anesthesia.  The flap was excised through the skin and subcutaneous tissue down to the layer of the underlying musculature.  The pedicle flap was carefully excised within this deep plane to maintain its blood supply.  The edges of the donor site were undermined.   The donor site was closed in a primary fashion.  The pedicle was then rotated into position and sutured.  Once the tube was sutured into place, adequate blood supply was confirmed with blanching and refill.  The pedicle was then wrapped with xeroform gauze and dressed appropriately with a telfa and gauze bandage to ensure continued blood supply and protect the attached pedicle.
Eye Shield Used: No
Wound Care (No Sutures): Petrolatum
Additional Anesthesia Volume In Cc: 6
Suturegard Retention Suture: 0-0 Nylon
Quadrants Reporting?: 0
S Plasty Text: Given the location and shape of the defect, and the orientation of relaxed skin tension lines, an S-plasty was deemed most appropriate for repair.  Using a sterile surgical marker, the appropriate outline of the S-plasty was drawn, incorporating the defect and placing the expected incisions within the relaxed skin tension lines where possible.  The area thus outlined was incised deep to adipose tissue with a #15 scalpel blade.  The skin margins were undermined to an appropriate distance in all directions utilizing iris scissors. The skin flaps were advanced over the defect.  The opposing margins were then approximated with interrupted buried subcutaneous sutures.
Asc Procedure Text (A): After obtaining clear surgical margins the patient was sent to an ASC for surgical repair.  The patient understands they will receive post-surgical care and follow-up from the ASC physician.
Bcc Infiltrative Histology Text: There were numerous aggregates of basaloid cells demonstrating an infiltrative pattern.
Mid-Level Procedure Text (C): After obtaining clear surgical margins the patient was sent to a mid-level provider for surgical repair.  The patient understands they will receive post-surgical care and follow-up from the mid-level provider.
V-Y Plasty Text: The defect edges were debeveled with a #15 scalpel blade.  Given the location of the defect, shape of the defect and the proximity to free margins an V-Y advancement flap was deemed most appropriate.  Using a sterile surgical marker, an appropriate advancement flap was drawn incorporating the defect and placing the expected incisions within the relaxed skin tension lines where possible.    The area thus outlined was incised deep to adipose tissue with a #15 scalpel blade.  The skin margins were undermined to an appropriate distance in all directions utilizing iris scissors.
Show Repair Assistants Variable: Yes
Rotation Flap Text: The defect edges were debeveled with a #15 scalpel blade.  Given the location of the defect, shape of the defect and the proximity to free margins a rotation flap was deemed most appropriate.  Using a sterile surgical marker, an appropriate rotation flap was drawn incorporating the defect and placing the expected incisions within the relaxed skin tension lines where possible.    The area thus outlined was incised deep to adipose tissue with a #15 scalpel blade.  The skin margins were undermined to an appropriate distance in all directions utilizing iris scissors.
Ftsg Text: The defect edges were debeveled with a #15 scalpel blade.  Given the location of the defect, shape of the defect and the proximity to free margins a full thickness skin graft was deemed most appropriate.  Using a sterile surgical marker, the primary defect shape was transferred to the donor site. The area thus outlined was incised deep to adipose tissue with a #15 scalpel blade.  The harvested graft was then trimmed of adipose tissue until only dermis and epidermis was left.  The skin margins of the secondary defect were undermined to an appropriate distance in all directions utilizing iris scissors.  The secondary defect was closed with interrupted buried subcutaneous sutures.  The skin edges were then re-apposed with running  sutures.  The skin graft was then placed in the primary defect and oriented appropriately.
Plastic Surgeon Procedure Text (D): After obtaining clear surgical margins the patient was sent to plastics for surgical repair.  The patient understands they will receive post-surgical care and follow-up from the referring physician's office.
Oculoplastic Surgeon Procedure Text (F): After obtaining clear surgical margins the patient was sent to oculoplastics for surgical repair.  The patient understands they will receive post-surgical care and follow-up from the referring physician's office.
Epidermal Closure Graft Donor Site (Optional): running
Crescentic Intermediate Repair Preamble Text (Leave Blank If You Do Not Want): Undermining was performed with blunt dissection.
Otolaryngologist Procedure Text (E): After obtaining clear surgical margins the patient was sent to otolaryngology for surgical repair.  The patient understands they will receive post-surgical care and follow-up from the referring physician's office.
Paramedian Forehead Flap Text: A decision was made to reconstruct the defect utilizing an interpolation axial flap and a staged reconstruction.  A telfa template was made of the defect.  This telfa template was then used to outline the paramedian forehead pedicle flap.  The donor area for the pedicle flap was then injected with anesthesia.  The flap was excised through the skin and subcutaneous tissue down to the layer of the underlying musculature.  The pedicle flap was carefully excised within this deep plane to maintain its blood supply.  The edges of the donor site were undermined.   The donor site was closed in a primary fashion.  The pedicle was then rotated into position and sutured.  Once the tube was sutured into place, adequate blood supply was confirmed with blanching and refill.  The pedicle was then wrapped with xeroform gauze and dressed appropriately with a telfa and gauze bandage to ensure continued blood supply and protect the attached pedicle.
Provider Procedure Text (F): After obtaining clear surgical margins the defect was repaired by another provider.
Biopsy Photograph Reviewed: No (no photograph available)
Surgeon/Pathologist Verbiage (Will Incorporate Name Of Surgeon From Intro If Not Blank): operated in two distinct and integrated capacities as the surgeon and pathologist.
Burow's Advancement Flap Text: The defect edges were debeveled with a #15 scalpel blade.  Given the location of the defect and the proximity to free margins a Burow's advancement flap was deemed most appropriate.  Using a sterile surgical marker, the appropriate advancement flap was drawn incorporating the defect and placing the expected incisions within the relaxed skin tension lines where possible.    The area thus outlined was incised deep to adipose tissue with a #15 scalpel blade.  The skin margins were undermined to an appropriate distance in all directions utilizing iris scissors.
Graft Donor Site Dermal Sutures (Optional): 5-0 Polysorb
O-T Advancement Flap Text: The defect edges were debeveled with a #15 scalpel blade.  Given the location of the defect, shape of the defect and the proximity to free margins an O-T advancement flap was deemed most appropriate.  Using a sterile surgical marker, an appropriate advancement flap was drawn incorporating the defect and placing the expected incisions within the relaxed skin tension lines where possible.    The area thus outlined was incised deep to adipose tissue with a #15 scalpel blade.  The skin margins were undermined to an appropriate distance in all directions utilizing iris scissors.
O-T Plasty Text: The defect edges were debeveled with a #15 scalpel blade.  Given the location of the defect, shape of the defect and the proximity to free margins an O-T plasty was deemed most appropriate.  Using a sterile surgical marker, an appropriate O-T plasty was drawn incorporating the defect and placing the expected incisions within the relaxed skin tension lines where possible.    The area thus outlined was incised deep to adipose tissue with a #15 scalpel blade.  The skin margins were undermined to an appropriate distance in all directions utilizing iris scissors.
Island Pedicle Flap-Requiring Vessel Identification Text: The defect edges were debeveled with a #15 scalpel blade.  Given the location of the defect, shape of the defect and the proximity to free margins an island pedicle advancement flap was deemed most appropriate.  Using a sterile surgical marker, an appropriate advancement flap was drawn, based on the axial vessel mentioned above, incorporating the defect, outlining the appropriate donor tissue and placing the expected incisions within the relaxed skin tension lines where possible.    The area thus outlined was incised deep to adipose tissue with a #15 scalpel blade.  The skin margins were undermined to an appropriate distance in all directions around the primary defect and laterally outward around the island pedicle utilizing iris scissors.  There was minimal undermining beneath the pedicle flap.
Trilobed Flap Text: The defect edges were debeveled with a #15 scalpel blade.  Given the location of the defect and the proximity to free margins a trilobed flap was deemed most appropriate.  Using a sterile surgical marker, an appropriate trilobed flap drawn around the defect.    The area thus outlined was incised deep to adipose tissue with a #15 scalpel blade.  The skin margins were undermined to an appropriate distance in all directions utilizing iris scissors.
Anesthesia Volume In Cc: 10
Repair Hemostasis (Optional): Pinpoint electrocautery
Postop Diagnosis: same
Cartilage Graft Text: The defect edges were debeveled with a #15 scalpel blade.  Given the location of the defect, shape of the defect, the fact the defect involved a full thickness cartilage defect a cartilage graft was deemed most appropriate.  An appropriate donor site was identified, cleansed, and anesthetized. The cartilage graft was then harvested and transferred to the recipient site, oriented appropriately and then sutured into place.  The secondary defect was then repaired using a primary closure.
Post-Care Instructions: I reviewed with the patient in detail post-care instructions. Patient is not to engage in any heavy lifting, exercise, or swimming for the next 14 days. Should the patient develop any fevers, chills, bleeding, severe pain patient will contact the office immediately.
Spiral Flap Text: The defect edges were debeveled with a #15 scalpel blade.  Given the location of the defect, shape of the defect and the proximity to free margins a spiral flap was deemed most appropriate.  Using a sterile surgical marker, an appropriate rotation flap was drawn incorporating the defect and placing the expected incisions within the relaxed skin tension lines where possible. The area thus outlined was incised deep to adipose tissue with a #15 scalpel blade.  The skin margins were undermined to an appropriate distance in all directions utilizing iris scissors.
Posterior Auricular Interpolation Flap Text: A decision was made to reconstruct the defect utilizing an interpolation axial flap and a staged reconstruction.  A telfa template was made of the defect.  This telfa template was then used to outline the posterior auricular interpolation flap.  The donor area for the pedicle flap was then injected with anesthesia.  The flap was excised through the skin and subcutaneous tissue down to the layer of the underlying musculature.  The pedicle flap was carefully excised within this deep plane to maintain its blood supply.  The edges of the donor site were undermined.   The donor site was closed in a primary fashion.  The pedicle was then rotated into position and sutured.  Once the tube was sutured into place, adequate blood supply was confirmed with blanching and refill.  The pedicle was then wrapped with xeroform gauze and dressed appropriately with a telfa and gauze bandage to ensure continued blood supply and protect the attached pedicle.
Lazy S Complex Repair Preamble Text (Leave Blank If You Do Not Want): Extensive wide undermining was performed.
Undermining Location (Optional): in the superficial subcutaneous fat
Helical Rim Text: The closure involved the helical rim.
Stage 7: Additional Anesthesia Type: 1% lidocaine with epinephrine
Surgical Defect Length In Cm (Optional): 3.3
Full Thickness Lip Wedge Repair (Flap) Text: Given the location of the defect and the proximity to free margins a full thickness wedge repair was deemed most appropriate.  Using a sterile surgical marker, the appropriate repair was drawn incorporating the defect and placing the expected incisions perpendicular to the vermilion border.  The vermilion border was also meticulously outlined to ensure appropriate reapproximation during the repair.  The area thus outlined was incised through and through with a #15 scalpel blade.  The muscularis and dermis were reaproximated with deep sutures following hemostasis. Care was taken to realign the vermilion border before proceeding with the superficial closure.  Once the vermilion was realigned the superfical and mucosal closure was finished.
Skin Substitute Text: The defect edges were debeveled with a #15 scalpel blade.  Given the location of the defect, shape of the defect and the proximity to free margins a skin substitute graft was deemed most appropriate.  The graft material was trimmed to fit the size of the defect. The graft was then placed in the primary defect and oriented appropriately.
Donor Site Anesthesia Type: same as repair anesthesia
Deep Sutures: 3-0 Maxon
Purse String (Intermediate) Text: Given the location of the defect and the characteristics of the surrounding skin a purse string intermediate closure was deemed most appropriate.  Undermining was performed circumfirentially around the surgical defect.  A purse string suture was then placed and tightened.
Partial Purse String (Simple) Text: Given the location of the defect and the characteristics of the surrounding skin a simple purse string closure was deemed most appropriate.  Undermining was performed circumfirentially around the surgical defect.  A purse string suture was then placed and tightened. Wound tension only allowed a partial closure of the circular defect.
Interpolation Flap Text: A decision was made to reconstruct the defect utilizing an interpolation axial flap and a staged reconstruction.  A telfa template was made of the defect.  This telfa template was then used to outline the interpolation flap.  The donor area for the pedicle flap was then injected with anesthesia.  The flap was excised through the skin and subcutaneous tissue down to the layer of the underlying musculature.  The interpolation flap was carefully excised within this deep plane to maintain its blood supply.  The edges of the donor site were undermined.   The donor site was closed in a primary fashion.  The pedicle was then rotated into position and sutured.  Once the tube was sutured into place, adequate blood supply was confirmed with blanching and refill.  The pedicle was then wrapped with xeroform gauze and dressed appropriately with a telfa and gauze bandage to ensure continued blood supply and protect the attached pedicle.
Mohs Case Number: [id number]
Mohs Rapid Report Verbiage: The area of clinically evident tumor was marked with skin marking ink and appropriately hatched.  The initial incision was made following the Mohs approach through the skin.  The specimen was taken to the lab, divided into the necessary number of pieces, chromacoded and processed according to the Mohs protocol.  This was repeated in successive stages until a tumor free defect was achieved.
Hatchet Flap Text: The defect edges were debeveled with a #15 scalpel blade.  Given the location of the defect, shape of the defect and the proximity to free margins a hatchet flap was deemed most appropriate.  Using a sterile surgical marker, an appropriate hatchet flap was drawn incorporating the defect and placing the expected incisions within the relaxed skin tension lines where possible.    The area thus outlined was incised deep to adipose tissue with a #15 scalpel blade.  The skin margins were undermined to an appropriate distance in all directions utilizing iris scissors.
H Plasty Text: Given the location of the defect, shape of the defect and the proximity to free margins a H-plasty was deemed most appropriate for repair.  Using a sterile surgical marker, the appropriate advancement arms of the H-plasty were drawn incorporating the defect and placing the expected incisions within the relaxed skin tension lines where possible. The area thus outlined was incised deep to adipose tissue with a #15 scalpel blade. The skin margins were undermined to an appropriate distance in all directions utilizing iris scissors.  The opposing advancement arms were then advanced into place in opposite direction and anchored with interrupted buried subcutaneous sutures.
Unna Boot Text: An Unna boot was placed to help immobilize the limb and facilitate more rapid healing.
Surgeon: Mora Bobo MD
Mohs Histo Method Verbiage: Each section was then chromacoded and processed in the Mohs lab using the Mohs protocol and submitted for frozen section.
Composite Graft Text: The defect edges were debeveled with a #15 scalpel blade.  Given the location of the defect, shape of the defect, the proximity to free margins and the fact the defect was full thickness a composite graft was deemed most appropriate.  The defect was outline and then transferred to the donor site.  A full thickness graft was then excised from the donor site. The graft was then placed in the primary defect, oriented appropriately and then sutured into place.  The secondary defect was then repaired using a primary closure.
Area M Indication Text: Tumors in this location are included in Area M (cheek, forehead, scalp, neck, jawline and pretibial skin).  Mohs surgery is indicated for tumors in these anatomic locations.
Tarsorrhaphy Text: A tarsorrhaphy was performed using Frost sutures.
Graft Cartilage Fenestration Text: The cartilage was fenestrated with a 2mm punch biopsy to help facilitate graft survival and healing.
Repair Anesthesia Method: local infiltration
Consent (Lip)/Introductory Paragraph: The rationale for Mohs was explained to the patient and consent was obtained. The risks, benefits and alternatives to therapy were discussed in detail. Specifically, the risks of lip deformity, changes in the oral aperture, infection, scarring, bleeding, prolonged wound healing, incomplete removal, allergy to anesthesia, nerve injury and recurrence were addressed. Prior to the procedure, the treatment site was clearly identified and confirmed by the patient. All components of Universal Protocol/PAUSE Rule completed.
Star Wedge Flap Text: The defect edges were debeveled with a #15 scalpel blade.  Given the location of the defect, shape of the defect and the proximity to free margins a star wedge flap was deemed most appropriate.  Using a sterile surgical marker, an appropriate rotation flap was drawn incorporating the defect and placing the expected incisions within the relaxed skin tension lines where possible. The area thus outlined was incised deep to adipose tissue with a #15 scalpel blade.  The skin margins were undermined to an appropriate distance in all directions utilizing iris scissors.
Surgical Defect Width In Cm (Optional): 1.8
Alar Island Pedicle Flap Text: The defect edges were debeveled with a #15 scalpel blade.  Given the location of the defect, shape of the defect and the proximity to the alar rim an island pedicle advancement flap was deemed most appropriate.  Using a sterile surgical marker, an appropriate advancement flap was drawn incorporating the defect, outlining the appropriate donor tissue and placing the expected incisions within the nasal ala running parallel to the alar rim. The area thus outlined was incised with a #15 scalpel blade.  The skin margins were undermined minimally to an appropriate distance in all directions around the primary defect and laterally outward around the island pedicle utilizing iris scissors.  There was minimal undermining beneath the pedicle flap.
Tumor Debulked?: curette
Mart-1 - Negative Histology Text: MART-1 staining demonstrates a normal density and pattern of melanocytes along the dermal-epidermal junction. The surgical margins are negative for tumor cells.
Mohs Method Verbiage: An incision at a 45 degree angle following the standard Mohs approach was done and the specimen was harvested as a microscopic controlled layer.
A-T Advancement Flap Text: The defect edges were debeveled with a #15 scalpel blade.  Given the location of the defect, shape of the defect and the proximity to free margins an A-T advancement flap was deemed most appropriate.  Using a sterile surgical marker, an appropriate advancement flap was drawn incorporating the defect and placing the expected incisions within the relaxed skin tension lines where possible.    The area thus outlined was incised deep to adipose tissue with a #15 scalpel blade.  The skin margins were undermined to an appropriate distance in all directions utilizing iris scissors.
Ear Star Wedge Flap Text: The defect edges were debeveled with a #15 blade scalpel.  Given the location of the defect and the proximity to free margins (helical rim) an ear star wedge flap was deemed most appropriate.  Using a sterile surgical marker, the appropriate flap was drawn incorporating the defect and placing the expected incisions between the helical rim and antihelix where possible.  The area thus outlined was incised through and through with a #15 scalpel blade.
Complex Repair And Graft Additional Text (Will Appearing After The Standard Complex Repair Text): The complex repair was not sufficient to completely close the primary defect. The remaining additional defect was repaired with the graft mentioned below.
Referring Physician (Optional): Pamela Keller MD
Medical Necessity Statement: Based on my medical judgement, Mohs surgery is the most appropriate treatment for this cancer compared to other treatments.
Bilateral Helical Rim Advancement Flap Text: The defect edges were debeveled with a #15 blade scalpel.  Given the location of the defect and the proximity to free margins (helical rim) a bilateral helical rim advancement flap was deemed most appropriate.  Using a sterile surgical marker, the appropriate advancement flaps were drawn incorporating the defect and placing the expected incisions between the helical rim and antihelix where possible.  The area thus outlined was incised through and through with a #15 scalpel blade.  With a skin hook and iris scissors, the flaps were gently and sharply undermined and freed up.
Repair Type: Complex Repair
Modified Advancement Flap Text: The defect edges were debeveled with a #15 scalpel blade.  Given the location of the defect, shape of the defect and the proximity to free margins a modified advancement flap was deemed most appropriate.  Using a sterile surgical marker, an appropriate advancement flap was drawn incorporating the defect and placing the expected incisions within the relaxed skin tension lines where possible.    The area thus outlined was incised deep to adipose tissue with a #15 scalpel blade.  The skin margins were undermined to an appropriate distance in all directions utilizing iris scissors.
O-Z Flap Text: The defect edges were debeveled with a #15 scalpel blade.  Given the location of the defect, shape of the defect and the proximity to free margins an O-Z flap was deemed most appropriate.  Using a sterile surgical marker, an appropriate transposition flap was drawn incorporating the defect and placing the expected incisions within the relaxed skin tension lines where possible. The area thus outlined was incised deep to adipose tissue with a #15 scalpel blade.  The skin margins were undermined to an appropriate distance in all directions utilizing iris scissors.
Consent (Nose)/Introductory Paragraph: The rationale for Mohs was explained to the patient and consent was obtained. The risks, benefits and alternatives to therapy were discussed in detail. Specifically, the risks of nasal deformity, changes in the flow of air through the nose, infection, scarring, bleeding, prolonged wound healing, incomplete removal, allergy to anesthesia, nerve injury and recurrence were addressed. Prior to the procedure, the treatment site was clearly identified and confirmed by the patient. All components of Universal Protocol/PAUSE Rule completed.
Consent (Spinal Accessory)/Introductory Paragraph: The rationale for Mohs was explained to the patient and consent was obtained. The risks, benefits and alternatives to therapy were discussed in detail. Specifically, the risks of damage to the spinal accessory nerve, infection, scarring, bleeding, prolonged wound healing, incomplete removal, allergy to anesthesia, and recurrence were addressed. Prior to the procedure, the treatment site was clearly identified and confirmed by the patient. All components of Universal Protocol/PAUSE Rule completed.
Epidermal Autograft Text: The defect edges were debeveled with a #15 scalpel blade.  Given the location of the defect, shape of the defect and the proximity to free margins an epidermal autograft was deemed most appropriate.  Using a sterile surgical marker, the primary defect shape was transferred to the donor site. The epidermal graft was then harvested.  The skin graft was then placed in the primary defect and oriented appropriately.
Mauc Instructions: By selecting yes to the question below the MAUC number will be added into the note.  This will be calculated automatically based on the diagnosis chosen, the size entered, the body zone selected (H,M,L) and the specific indications you chose. You will also have the option to override the Mohs AUC if you disagree with the automatically calculated number and this option is found in the Case Summary tab.
Estimated Blood Loss (Cc): minimal
Undermining Type: Entire Wound
Keystone Flap Text: The defect edges were debeveled with a #15 scalpel blade.  Given the location of the defect, shape of the defect a keystone flap was deemed most appropriate.  Using a sterile surgical marker, an appropriate keystone flap was drawn incorporating the defect, outlining the appropriate donor tissue and placing the expected incisions within the relaxed skin tension lines where possible. The area thus outlined was incised deep to adipose tissue with a #15 scalpel blade.  The skin margins were undermined to an appropriate distance in all directions around the primary defect and laterally outward around the flap utilizing iris scissors.
Consent Type: Consent 1 (Standard)
Chonodrocutaneous Helical Advancement Flap Text: The defect edges were debeveled with a #15 scalpel blade.  Given the location of the defect and the proximity to free margins a chondrocutaneous helical advancement flap was deemed most appropriate.  Using a sterile surgical marker, the appropriate advancement flap was drawn incorporating the defect and placing the expected incisions within the relaxed skin tension lines where possible.    The area thus outlined was incised deep to adipose tissue with a #15 scalpel blade.  The skin margins were undermined to an appropriate distance in all directions utilizing iris scissors.
Helical Rim Advancement Flap Text: The defect edges were debeveled with a #15 blade scalpel.  Given the location of the defect and the proximity to free margins (helical rim) a double helical rim advancement flap was deemed most appropriate.  Using a sterile surgical marker, the appropriate advancement flaps were drawn incorporating the defect and placing the expected incisions between the helical rim and antihelix where possible.  The area thus outlined was incised through and through with a #15 scalpel blade.  With a skin hook and iris scissors, the flaps were gently and sharply undermined and freed up.
Consent (Marginal Mandibular)/Introductory Paragraph: The rationale for Mohs was explained to the patient and consent was obtained. The risks, benefits and alternatives to therapy were discussed in detail. Specifically, the risks of damage to the marginal mandibular branch of the facial nerve, infection, scarring, bleeding, prolonged wound healing, incomplete removal, allergy to anesthesia, and recurrence were addressed. Prior to the procedure, the treatment site was clearly identified and confirmed by the patient. All components of Universal Protocol/PAUSE Rule completed.
Double Island Pedicle Flap Text: The defect edges were debeveled with a #15 scalpel blade.  Given the location of the defect, shape of the defect and the proximity to free margins a double island pedicle advancement flap was deemed most appropriate.  Using a sterile surgical marker, an appropriate advancement flap was drawn incorporating the defect, outlining the appropriate donor tissue and placing the expected incisions within the relaxed skin tension lines where possible.    The area thus outlined was incised deep to adipose tissue with a #15 scalpel blade.  The skin margins were undermined to an appropriate distance in all directions around the primary defect and laterally outward around the island pedicle utilizing iris scissors.  There was minimal undermining beneath the pedicle flap.
Dressing: pressure dressing with telfa
Date Of Previous Biopsy (Optional): 5-12-20
Dermal Autograft Text: The defect edges were debeveled with a #15 scalpel blade.  Given the location of the defect, shape of the defect and the proximity to free margins a dermal autograft was deemed most appropriate.  Using a sterile surgical marker, the primary defect shape was transferred to the donor site. The area thus outlined was incised deep to adipose tissue with a #15 scalpel blade.  The harvested graft was then trimmed of adipose and epidermal tissue until only dermis was left.  The skin graft was then placed in the primary defect and oriented appropriately.
Graft Donor Site Epidermal Sutures (Optional): 5-0 Surgipro
Consent (Near Eyelid Margin)/Introductory Paragraph: The rationale for Mohs was explained to the patient and consent was obtained. The risks, benefits and alternatives to therapy were discussed in detail. Specifically, the risks of ectropion or eyelid deformity, infection, scarring, bleeding, prolonged wound healing, incomplete removal, allergy to anesthesia, nerve injury and recurrence were addressed. Prior to the procedure, the treatment site was clearly identified and confirmed by the patient. All components of Universal Protocol/PAUSE Rule completed.
Cheek-To-Nose Interpolation Flap Text: A decision was made to reconstruct the defect utilizing an interpolation axial flap and a staged reconstruction.  A telfa template was made of the defect.  This telfa template was then used to outline the Cheek-To-Nose Interpolation flap.  The donor area for the pedicle flap was then injected with anesthesia.  The flap was excised through the skin and subcutaneous tissue down to the layer of the underlying musculature.  The interpolation flap was carefully excised within this deep plane to maintain its blood supply.  The edges of the donor site were undermined.   The donor site was closed in a primary fashion.  The pedicle was then rotated into position and sutured.  Once the tube was sutured into place, adequate blood supply was confirmed with blanching and refill.  The pedicle was then wrapped with xeroform gauze and dressed appropriately with a telfa and gauze bandage to ensure continued blood supply and protect the attached pedicle.
Wound Check: 6 weeks
Manual Repair Warning Statement: We plan on removing the manually selected variable below in favor of our much easier automatic structured text blocks found in the previous tab. We decided to do this to help make the flow better and give you the full power of structured data. Manual selection is never going to be ideal in our platform and I would encourage you to avoid using manual selection from this point on, especially since I will be sunsetting this feature. It is important that you do one of two things with the customized text below. First, you can save all of the text in a word file so you can have it for future reference. Second, transfer the text to the appropriate area in the Library tab. Lastly, if there is a flap or graft type which we do not have you need to let us know right away so I can add it in before the variable is hidden. No need to panic, we plan to give you roughly 6 months to make the change.
Subsequent Stages Histo Method Verbiage: Using a similar technique to that described above, a thin layer of tissue was removed from all areas where tumor was visible on the previous stage.  The tissue was again oriented, mapped, dyed, and processed as above.
Melolabial Transposition Flap Text: The defect edges were debeveled with a #15 scalpel blade.  Given the location of the defect and the proximity to free margins a melolabial flap was deemed most appropriate.  Using a sterile surgical marker, an appropriate melolabial transposition flap was drawn incorporating the defect.    The area thus outlined was incised deep to adipose tissue with a #15 scalpel blade.  The skin margins were undermined to an appropriate distance in all directions utilizing iris scissors.
Island Pedicle Flap With Canthal Suspension Text: The defect edges were debeveled with a #15 scalpel blade.  Given the location of the defect, shape of the defect and the proximity to free margins an island pedicle advancement flap was deemed most appropriate.  Using a sterile surgical marker, an appropriate advancement flap was drawn incorporating the defect, outlining the appropriate donor tissue and placing the expected incisions within the relaxed skin tension lines where possible. The area thus outlined was incised deep to adipose tissue with a #15 scalpel blade.  The skin margins were undermined to an appropriate distance in all directions around the primary defect and laterally outward around the island pedicle utilizing iris scissors.  There was minimal undermining beneath the pedicle flap. A suspension suture was placed in the canthal tendon to prevent tension and prevent ectropion.
Where Do You Want The Question To Include Opioid Counseling Located?: Case Summary Tab
Previous Accession (Optional): outside path
Double O-Z Flap Text: The defect edges were debeveled with a #15 scalpel blade.  Given the location of the defect, shape of the defect and the proximity to free margins a Double O-Z flap was deemed most appropriate.  Using a sterile surgical marker, an appropriate transposition flap was drawn incorporating the defect and placing the expected incisions within the relaxed skin tension lines where possible. The area thus outlined was incised deep to adipose tissue with a #15 scalpel blade.  The skin margins were undermined to an appropriate distance in all directions utilizing iris scissors.
No Repair - Repaired With Adjacent Surgical Defect Text (Leave Blank If You Do Not Want): After obtaining clear surgical margins the defect was repaired concurrently with another surgical defect which was in close approximation.
Consent 2/Introductory Paragraph: Mohs surgery was explained to the patient and consent was obtained. The risks, benefits and alternatives to therapy were discussed in detail. Specifically, the risks of infection, scarring, bleeding, prolonged wound healing, incomplete removal, allergy to anesthesia, nerve injury and recurrence were addressed. Prior to the procedure, the treatment site was clearly identified and confirmed by the patient. All components of Universal Protocol/PAUSE Rule completed.
Information: Selecting Yes will display possible errors in your note based on the variables you have selected. This validation is only offered as a suggestion for you. PLEASE NOTE THAT THE VALIDATION TEXT WILL BE REMOVED WHEN YOU FINALIZE YOUR NOTE. IF YOU WANT TO FAX A PRELIMINARY NOTE YOU WILL NEED TO TOGGLE THIS TO 'NO' IF YOU DO NOT WANT IT IN YOUR FAXED NOTE.
Retention Suture Text: Retention sutures were placed to support the closure and prevent dehiscence.
Mastoid Interpolation Flap Text: A decision was made to reconstruct the defect utilizing an interpolation axial flap and a staged reconstruction.  A telfa template was made of the defect.  This telfa template was then used to outline the mastoid interpolation flap.  The donor area for the pedicle flap was then injected with anesthesia.  The flap was excised through the skin and subcutaneous tissue down to the layer of the underlying musculature.  The pedicle flap was carefully excised within this deep plane to maintain its blood supply.  The edges of the donor site were undermined.   The donor site was closed in a primary fashion.  The pedicle was then rotated into position and sutured.  Once the tube was sutured into place, adequate blood supply was confirmed with blanching and refill.  The pedicle was then wrapped with xeroform gauze and dressed appropriately with a telfa and gauze bandage to ensure continued blood supply and protect the attached pedicle.
Mercedes Flap Text: The defect edges were debeveled with a #15 scalpel blade.  Given the location of the defect, shape of the defect and the proximity to free margins a Mercedes flap was deemed most appropriate.  Using a sterile surgical marker, an appropriate advancement flap was drawn incorporating the defect and placing the expected incisions within the relaxed skin tension lines where possible. The area thus outlined was incised deep to adipose tissue with a #15 scalpel blade.  The skin margins were undermined to an appropriate distance in all directions utilizing iris scissors.
Closure 4 Information: This tab is for additional flaps and grafts above and beyond our usual structured repairs.  Please note if you enter information here it will not currently bill and you will need to add the billing information manually.
Localized Dermabrasion With Wire Brush Text: The patient was draped in routine manner.  Localized dermabrasion using 3 x 17 mm wire brush was performed in routine manner to papillary dermis. This spot dermabrasion is being performed to complete skin cancer reconstruction. It also will eliminate the other sun damaged precancerous cells that are known to be part of the regional effect of a lifetime's worth of sun exposure. This localized dermabrasion is therapeutic and should not be considered cosmetic in any regard.
Non-Graft Cartilage Fenestration Text: The cartilage was fenestrated with a 2mm punch biopsy to help facilitate healing.
Number Of Stages: 2
Area H Indication Text: Tumors in this location are included in Area H (eyelids, eyebrows, nose, lips, chin, ear, pre-auricular, post-auricular, temple, genitalia, hands, feet, ankles and areola).  Tissue conservation is critical in these anatomic locations.
Wound Care: Vaseline
Mucosal Advancement Flap Text: Given the location of the defect, shape of the defect and the proximity to free margins a mucosal advancement flap was deemed most appropriate. Incisions were made with a 15 blade scalpel in the appropriate fashion along the cutaneous vermilion border and the mucosal lip. The remaining actinically damaged mucosal tissue was excised.  The mucosal advancement flap was then elevated to the gingival sulcus with care taken to preserve the neurovascular structures and advanced into the primary defect. Care was taken to ensure that precise realignment of the vermilion border was achieved.
Stage 1: Number Of Blocks?: 1
Complex Repair And Flap Additional Text (Will Appearing After The Standard Complex Repair Text): The complex repair was not sufficient to completely close the primary defect. The remaining additional defect was repaired with the flap mentioned below.
Cheek Interpolation Flap Text: A decision was made to reconstruct the defect utilizing an interpolation axial flap and a staged reconstruction.  A telfa template was made of the defect.  This telfa template was then used to outline the Cheek Interpolation flap.  The donor area for the pedicle flap was then injected with anesthesia.  The flap was excised through the skin and subcutaneous tissue down to the layer of the underlying musculature.  The interpolation flap was carefully excised within this deep plane to maintain its blood supply.  The edges of the donor site were undermined.   The donor site was closed in a primary fashion.  The pedicle was then rotated into position and sutured.  Once the tube was sutured into place, adequate blood supply was confirmed with blanching and refill.  The pedicle was then wrapped with xeroform gauze and dressed appropriately with a telfa and gauze bandage to ensure continued blood supply and protect the attached pedicle.
Secondary Intention Text (Leave Blank If You Do Not Want): The defect will heal with secondary intention.
Pain Refusal Text: I offered to prescribe pain medication but the patient refused to take this medication.
Z Plasty Text: The lesion was extirpated to the level of the fat with a #15 scalpel blade.  Given the location of the defect, shape of the defect and the proximity to free margins a Z-plasty was deemed most appropriate for repair.  Using a sterile surgical marker, the appropriate transposition arms of the Z-plasty were drawn incorporating the defect and placing the expected incisions within the relaxed skin tension lines where possible.    The area thus outlined was incised deep to adipose tissue with a #15 scalpel blade.  The skin margins were undermined to an appropriate distance in all directions utilizing iris scissors.  The opposing transposition arms were then transposed into place in opposite direction and anchored with interrupted buried subcutaneous sutures.
W Plasty Text: The lesion was extirpated to the level of the fat with a #15 scalpel blade.  Given the location of the defect, shape of the defect and the proximity to free margins a W-plasty was deemed most appropriate for repair.  Using a sterile surgical marker, the appropriate transposition arms of the W-plasty were drawn incorporating the defect and placing the expected incisions within the relaxed skin tension lines where possible.    The area thus outlined was incised deep to adipose tissue with a #15 scalpel blade.  The skin margins were undermined to an appropriate distance in all directions utilizing iris scissors.  The opposing transposition arms were then transposed into place in opposite direction and anchored with interrupted buried subcutaneous sutures.
Suturegard Body: The suture ends were repeatedly re-tightened and re-clamped to achieve the desired tissue expansion.
Simple / Intermediate / Complex Repair - Final Wound Length In Cm: 4.8
Ear Wedge Repair Text: A wedge excision was completed by carrying down an excision through the full thickness of the ear and cartilage with an inward facing Burow's triangle. The wound was then closed in a layered fashion.
Eye Protection Verbiage: Before proceeding with the stage, a plastic scleral shield was inserted. The globe was anesthetized with a few drops of 1% lidocaine with 1:100,000 epinephrine. Then, an appropriate sized scleral shield was chosen and coated with lacrilube ointment. The shield was gently inserted and left in place for the duration of each stage. After the stage was completed, the shield was gently removed.
Xenograft Text: The defect edges were debeveled with a #15 scalpel blade.  Given the location of the defect, shape of the defect and the proximity to free margins a xenograft was deemed most appropriate.  The graft was then trimmed to fit the size of the defect.  The graft was then placed in the primary defect and oriented appropriately.
Detail Level: Detailed
Advancement-Rotation Flap Text: The defect edges were debeveled with a #15 scalpel blade.  Given the location of the defect, shape of the defect and the proximity to free margins an advancement-rotation flap was deemed most appropriate.  Using a sterile surgical marker, an appropriate flap was drawn incorporating the defect and placing the expected incisions within the relaxed skin tension lines where possible. The area thus outlined was incised deep to adipose tissue with a #15 scalpel blade.  The skin margins were undermined to an appropriate distance in all directions utilizing iris scissors.
Consent (Ear)/Introductory Paragraph: The rationale for Mohs was explained to the patient and consent was obtained. The risks, benefits and alternatives to therapy were discussed in detail. Specifically, the risks of ear deformity, infection, scarring, bleeding, prolonged wound healing, incomplete removal, allergy to anesthesia, nerve injury and recurrence were addressed. Prior to the procedure, the treatment site was clearly identified and confirmed by the patient. All components of Universal Protocol/PAUSE Rule completed.
Rhombic Flap Text: The defect edges were debeveled with a #15 scalpel blade.  Given the location of the defect and the proximity to free margins a rhombic flap was deemed most appropriate.  Using a sterile surgical marker, an appropriate rhombic flap was drawn incorporating the defect.    The area thus outlined was incised deep to adipose tissue with a #15 scalpel blade.  The skin margins were undermined to an appropriate distance in all directions utilizing iris scissors.
Suturegard Intro: Intraoperative tissue expansion was performed, utilizing the SUTUREGARD device, in order to reduce wound tension.
Cheiloplasty (Complex) Text: A decision was made to reconstruct the defect with a  cheiloplasty.  The defect was undermined extensively.  Additional obicularis oris muscle was excised with a 15 blade scalpel.  The defect was converted into a full thickness wedge to facilite a better cosmetic result.  Small vessels were then tied off with 5-0 monocyrl. The obicularis oris, superficial fascia, adipose and dermis were then reapproximated.  After the deeper layers were approximated the epidermis was reapproximated with particular care given to realign the vermilion border.
Initial Size Of Lesion: 2.6
Area L Indication Text: Tumors in this location are included in Area L (trunk and extremities).  Mohs surgery is indicated for larger tumors, or tumors with aggressive histologic features, in these anatomic locations.
V-Y Flap Text: The defect edges were debeveled with a #15 scalpel blade.  Given the location of the defect, shape of the defect and the proximity to free margins a V-Y flap was deemed most appropriate.  Using a sterile surgical marker, an appropriate advancement flap was drawn incorporating the defect and placing the expected incisions within the relaxed skin tension lines where possible.    The area thus outlined was incised deep to adipose tissue with a #15 scalpel blade.  The skin margins were undermined to an appropriate distance in all directions utilizing iris scissors.
Bilobed Transposition Flap Text: The defect edges were debeveled with a #15 scalpel blade.  Given the location of the defect and the proximity to free margins a bilobed transposition flap was deemed most appropriate.  Using a sterile surgical marker, an appropriate bilobe flap drawn around the defect.    The area thus outlined was incised deep to adipose tissue with a #15 scalpel blade.  The skin margins were undermined to an appropriate distance in all directions utilizing iris scissors.
Anesthesia Volume In Cc: 9
Mart-1 - Positive Histology Text: MART-1 staining demonstrates areas of higher density and clustering of melanocytes with Pagetoid spread upwards within the epidermis. The surgical margins are positive for tumor cells.
Advancement Flap (Single) Text: The defect edges were debeveled with a #15 scalpel blade.  Given the location of the defect and the proximity to free margins a single advancement flap was deemed most appropriate.  Using a sterile surgical marker, an appropriate advancement flap was drawn incorporating the defect and placing the expected incisions within the relaxed skin tension lines where possible.    The area thus outlined was incised deep to adipose tissue with a #15 scalpel blade.  The skin margins were undermined to an appropriate distance in all directions utilizing iris scissors.
Transposition Flap Text: The defect edges were debeveled with a #15 scalpel blade.  Given the location of the defect and the proximity to free margins a transposition flap was deemed most appropriate.  Using a sterile surgical marker, an appropriate transposition flap was drawn incorporating the defect.    The area thus outlined was incised deep to adipose tissue with a #15 scalpel blade.  The skin margins were undermined to an appropriate distance in all directions utilizing iris scissors.
Cheiloplasty (Less Than 50%) Text: A decision was made to reconstruct the defect with a  cheiloplasty.  The defect was undermined extensively.  Additional obicularis oris muscle was excised with a 15 blade scalpel.  The defect was converted into a full thickness wedge, of less than 50% of the vertical height of the lip, to facilite a better cosmetic result.  Small vessels were then tied off with 5-0 monocyrl. The obicularis oris, superficial fascia, adipose and dermis were then reapproximated.  After the deeper layers were approximated the epidermis was reapproximated with particular care given to realign the vermilion border.
Debridement Text: The wound edges were debrided prior to proceeding with the closure to facilitate wound healing.
Surgical Defect Length In Cm (Optional): 3.1
Consent 3/Introductory Paragraph: I gave the patient a chance to ask questions they had about the procedure.  Following this I explained the Mohs procedure and consent was obtained. The risks, benefits and alternatives to therapy were discussed in detail. Specifically, the risks of infection, scarring, bleeding, prolonged wound healing, incomplete removal, allergy to anesthesia, nerve injury and recurrence were addressed. Prior to the procedure, the treatment site was clearly identified and confirmed by the patient. All components of Universal Protocol/PAUSE Rule completed.
Muscle Hinge Flap Text: The defect edges were debeveled with a #15 scalpel blade.  Given the size, depth and location of the defect and the proximity to free margins a muscle hinge flap was deemed most appropriate.  Using a sterile surgical marker, an appropriate hinge flap was drawn incorporating the defect. The area thus outlined was incised with a #15 scalpel blade.  The skin margins were undermined to an appropriate distance in all directions utilizing iris scissors.
X Size Of Lesion In Cm (Optional): 1.4
O-L Flap Text: The defect edges were debeveled with a #15 scalpel blade.  Given the location of the defect, shape of the defect and the proximity to free margins an O-L flap was deemed most appropriate.  Using a sterile surgical marker, an appropriate advancement flap was drawn incorporating the defect and placing the expected incisions within the relaxed skin tension lines where possible.    The area thus outlined was incised deep to adipose tissue with a #15 scalpel blade.  The skin margins were undermined to an appropriate distance in all directions utilizing iris scissors.
Consent 1/Introductory Paragraph: The rationale for Mohs was explained to the patient and consent was obtained. The risks, benefits and alternatives to therapy were discussed in detail. Specifically, the risks of infection, scarring, bleeding, prolonged wound healing, incomplete removal, allergy to anesthesia, nerve injury and recurrence were addressed. Prior to the procedure, the treatment site was clearly identified and confirmed by the patient. All components of Universal Protocol/PAUSE Rule completed.
Hemostasis: Electrocautery
Banner Transposition Flap Text: The defect edges were debeveled with a #15 scalpel blade.  Given the location of the defect and the proximity to free margins a Banner transposition flap was deemed most appropriate.  Using a sterile surgical marker, an appropriate flap drawn around the defect. The area thus outlined was incised deep to adipose tissue with a #15 scalpel blade.  The skin margins were undermined to an appropriate distance in all directions utilizing iris scissors.
Purse String (Simple) Text: Given the location of the defect and the characteristics of the surrounding skin a purse string closure was deemed most appropriate.  Undermining was performed circumfirentially around the surgical defect.  A purse string suture was then placed and tightened.
Home Suture Removal Text: Patient was provided instructions on removing sutures and will remove their sutures at home.  If they have any questions or difficulties they will call the office.
Retention Suture Bite Size: 1 mm
Tissue Cultured Epidermal Autograft Text: The defect edges were debeveled with a #15 scalpel blade.  Given the location of the defect, shape of the defect and the proximity to free margins a tissue cultured epidermal autograft was deemed most appropriate.  The graft was then trimmed to fit the size of the defect.  The graft was then placed in the primary defect and oriented appropriately.
Alternatives Discussed Intro (Do Not Add Period): I discussed alternative treatments to Mohs surgery and specifically discussed the risks and benefits of
Rhomboid Transposition Flap Text: The defect edges were debeveled with a #15 scalpel blade.  Given the location of the defect and the proximity to free margins a rhomboid transposition flap was deemed most appropriate.  Using a sterile surgical marker, an appropriate rhomboid flap was drawn incorporating the defect.    The area thus outlined was incised deep to adipose tissue with a #15 scalpel blade.  The skin margins were undermined to an appropriate distance in all directions utilizing iris scissors.
Body Location Override (Optional - Billing Will Still Be Based On Selected Body Map Location If Applicable): left upper chest
Crescentic Advancement Flap Text: The defect edges were debeveled with a #15 scalpel blade.  Given the location of the defect and the proximity to free margins a crescentic advancement flap was deemed most appropriate.  Using a sterile surgical marker, the appropriate advancement flap was drawn incorporating the defect and placing the expected incisions within the relaxed skin tension lines where possible.    The area thus outlined was incised deep to adipose tissue with a #15 scalpel blade.  The skin margins were undermined to an appropriate distance in all directions utilizing iris scissors.
Bi-Rhombic Flap Text: The defect edges were debeveled with a #15 scalpel blade.  Given the location of the defect and the proximity to free margins a bi-rhombic flap was deemed most appropriate.  Using a sterile surgical marker, an appropriate rhombic flap was drawn incorporating the defect. The area thus outlined was incised deep to adipose tissue with a #15 scalpel blade.  The skin margins were undermined to an appropriate distance in all directions utilizing iris scissors.
Bilobed Flap Text: The defect edges were debeveled with a #15 scalpel blade.  Given the location of the defect and the proximity to free margins a bilobe flap was deemed most appropriate.  Using a sterile surgical marker, an appropriate bilobe flap drawn around the defect.    The area thus outlined was incised deep to adipose tissue with a #15 scalpel blade.  The skin margins were undermined to an appropriate distance in all directions utilizing iris scissors.
Surgical Defect Width In Cm (Optional): 2.2
Consent (Temporal Branch)/Introductory Paragraph: The rationale for Mohs was explained to the patient and consent was obtained. The risks, benefits and alternatives to therapy were discussed in detail. Specifically, the risks of damage to the temporal branch of the facial nerve, infection, scarring, bleeding, prolonged wound healing, incomplete removal, allergy to anesthesia, and recurrence were addressed. Prior to the procedure, the treatment site was clearly identified and confirmed by the patient. All components of Universal Protocol/PAUSE Rule completed.
Partial Purse String (Intermediate) Text: Given the location of the defect and the characteristics of the surrounding skin an intermediate purse string closure was deemed most appropriate.  Undermining was performed circumfirentially around the surgical defect.  A purse string suture was then placed and tightened. Wound tension only allowed a partial closure of the circular defect.
O-Z Plasty Text: The defect edges were debeveled with a #15 scalpel blade.  Given the location of the defect, shape of the defect and the proximity to free margins an O-Z plasty (double transposition flap) was deemed most appropriate.  Using a sterile surgical marker, the appropriate transposition flaps were drawn incorporating the defect and placing the expected incisions within the relaxed skin tension lines where possible.    The area thus outlined was incised deep to adipose tissue with a #15 scalpel blade.  The skin margins were undermined to an appropriate distance in all directions utilizing iris scissors.  Hemostasis was achieved with electrocautery.  The flaps were then transposed into place, one clockwise and the other counterclockwise, and anchored with interrupted buried subcutaneous sutures.
Dorsal Nasal Flap Text: The defect edges were debeveled with a #15 scalpel blade.  Given the location of the defect and the proximity to free margins a dorsal nasal flap was deemed most appropriate.  Using a sterile surgical marker, an appropriate dorsal nasal flap was drawn around the defect.    The area thus outlined was incised deep to adipose tissue with a #15 scalpel blade.  The skin margins were undermined to an appropriate distance in all directions utilizing iris scissors.
Epidermal Closure: running subcuticular
Bcc Histology Text: There were numerous aggregates of basaloid cells.
Same Histology In Subsequent Stages Text: The pattern and morphology of the tumor is as described in the first stage.
Island Pedicle Flap Text: The defect edges were debeveled with a #15 scalpel blade.  Given the location of the defect, shape of the defect and the proximity to free margins an island pedicle advancement flap was deemed most appropriate.  Using a sterile surgical marker, an appropriate advancement flap was drawn incorporating the defect, outlining the appropriate donor tissue and placing the expected incisions within the relaxed skin tension lines where possible.    The area thus outlined was incised deep to adipose tissue with a #15 scalpel blade.  The skin margins were undermined to an appropriate distance in all directions around the primary defect and laterally outward around the island pedicle utilizing iris scissors.  There was minimal undermining beneath the pedicle flap.
Vermilion Border Text: The closure involved the vermilion border.
Consent (Scalp)/Introductory Paragraph: The rationale for Mohs was explained to the patient and consent was obtained. The risks, benefits and alternatives to therapy were discussed in detail. Specifically, the risks of changes in hair growth pattern secondary to repair, infection, scarring, bleeding, prolonged wound healing, incomplete removal, allergy to anesthesia, nerve injury and recurrence were addressed. Prior to the procedure, the treatment site was clearly identified and confirmed by the patient. All components of Universal Protocol/PAUSE Rule completed.
Graft Donor Site Bandage (Optional-Leave Blank If You Don't Want In Note): Aquaplast was fitted to the graft site and sewn into place. A pressure bandage were applied to the donor site and over the aquaplast bolster.
Location Indication Override (Is Already Calculated Based On Selected Body Location): Area L
Nostril Rim Text: The closure involved the nostril rim.
Advancement Flap (Double) Text: The defect edges were debeveled with a #15 scalpel blade.  Given the location of the defect and the proximity to free margins a double advancement flap was deemed most appropriate.  Using a sterile surgical marker, the appropriate advancement flaps were drawn incorporating the defect and placing the expected incisions within the relaxed skin tension lines where possible.    The area thus outlined was incised deep to adipose tissue with a #15 scalpel blade.  The skin margins were undermined to an appropriate distance in all directions utilizing iris scissors.
No Residual Tumor Seen Histology Text: There were no malignant cells seen in the sections examined.
Double O-Z Plasty Text: The defect edges were debeveled with a #15 scalpel blade.  Given the location of the defect, shape of the defect and the proximity to free margins a Double O-Z plasty (double transposition flap) was deemed most appropriate.  Using a sterile surgical marker, the appropriate transposition flaps were drawn incorporating the defect and placing the expected incisions within the relaxed skin tension lines where possible. The area thus outlined was incised deep to adipose tissue with a #15 scalpel blade.  The skin margins were undermined to an appropriate distance in all directions utilizing iris scissors.  Hemostasis was achieved with electrocautery.  The flaps were then transposed into place, one clockwise and the other counterclockwise, and anchored with interrupted buried subcutaneous sutures.
Closure 2 Information: This tab is for additional flaps and grafts, including complex repair and grafts and complex repair and flaps. You can also specify a different location for the additional defect, if the location is the same you do not need to select a new one. We will insert the automated text for the repair you select below just as we do for solitary flaps and grafts. Please note that at this time if you select a location with a different insurance zone you will need to override the ICD10 and CPT if appropriate.
Split-Thickness Skin Graft Text: The defect edges were debeveled with a #15 scalpel blade.  Given the location of the defect, shape of the defect and the proximity to free margins a split thickness skin graft was deemed most appropriate.  Using a sterile surgical marker, the primary defect shape was transferred to the donor site. The split thickness graft was then harvested.  The skin graft was then placed in the primary defect and oriented appropriately.
Was The Patient On Physician Recommended Anticoagulation Therapy?: Please Select the Appropriate Response
Staging Info: By selecting yes to the question above you will include information on AJCC 8 tumor staging in your Mohs note. Information on tumor staging will be automatically added for SCCs on the head and neck. AJCC 8 includes tumor size, tumor depth, perineural involvement and bone invasion.
Peng Advancement Flap Text: The defect edges were debeveled with a #15 scalpel blade.  Given the location of the defect, shape of the defect and the proximity to free margins a Peng advancement flap was deemed most appropriate.  Using a sterile surgical marker, an appropriate advancement flap was drawn incorporating the defect and placing the expected incisions within the relaxed skin tension lines where possible. The area thus outlined was incised deep to adipose tissue with a #15 scalpel blade.  The skin margins were undermined to an appropriate distance in all directions utilizing iris scissors.
Burow's Graft Text: The defect edges were debeveled with a #15 scalpel blade.  Given the location of the defect, shape of the defect, the proximity to free margins and the presence of a standing cone deformity a Burow's skin graft was deemed most appropriate. The standing cone was removed and this tissue was then trimmed to the shape of the primary defect. The adipose tissue was also removed until only dermis and epidermis were left.  The skin margins of the secondary defect were undermined to an appropriate distance in all directions utilizing iris scissors.  The secondary defect was closed with interrupted buried subcutaneous sutures.  The skin edges were then re-apposed with running  sutures.  The skin graft was then placed in the primary defect and oriented appropriately.
Tumor Depth: Less than 6mm from granular layer and no invasion beyond the subcutaneous fat

## 2020-08-06 ENCOUNTER — APPOINTMENT (RX ONLY)
Dept: URBAN - METROPOLITAN AREA CLINIC 36 | Facility: CLINIC | Age: 65
Setting detail: DERMATOLOGY
End: 2020-08-06

## 2020-08-06 DIAGNOSIS — Z48.817 ENCOUNTER FOR SURGICAL AFTERCARE FOLLOWING SURGERY ON THE SKIN AND SUBCUTANEOUS TISSUE: ICD-10-CM

## 2020-08-06 PROCEDURE — ? POST-OP WOUND CHECK

## 2020-08-06 PROCEDURE — 99024 POSTOP FOLLOW-UP VISIT: CPT

## 2020-08-06 ASSESSMENT — LOCATION SIMPLE DESCRIPTION DERM: LOCATION SIMPLE: CHEST

## 2020-08-06 ASSESSMENT — LOCATION DETAILED DESCRIPTION DERM: LOCATION DETAILED: UPPER STERNUM

## 2020-08-06 ASSESSMENT — LOCATION ZONE DERM: LOCATION ZONE: TRUNK

## 2020-08-06 NOTE — PROCEDURE: POST-OP WOUND CHECK
Detail Level: Detailed
Add 10509 Cpt? (Important Note: In 2017 The Use Of 92524 Is Being Tracked By Cms To Determine Future Global Period Reimbursement For Global Periods): yes
Wound Evaluated By: Mora Bobo md

## 2020-09-18 ENCOUNTER — HOSPITAL ENCOUNTER (OUTPATIENT)
Facility: MEDICAL CENTER | Age: 65
End: 2020-09-18
Attending: SPECIALIST
Payer: COMMERCIAL

## 2020-09-18 PROCEDURE — 87086 URINE CULTURE/COLONY COUNT: CPT

## 2020-09-21 LAB
BACTERIA UR CULT: NORMAL
SIGNIFICANT IND 70042: NORMAL
SITE SITE: NORMAL
SOURCE SOURCE: NORMAL

## 2020-10-29 ENCOUNTER — HOSPITAL ENCOUNTER (OUTPATIENT)
Dept: LAB | Facility: MEDICAL CENTER | Age: 65
End: 2020-10-29
Attending: FAMILY MEDICINE
Payer: MEDICARE

## 2020-10-29 PROCEDURE — C9803 HOPD COVID-19 SPEC COLLECT: HCPCS

## 2020-10-29 PROCEDURE — U0003 INFECTIOUS AGENT DETECTION BY NUCLEIC ACID (DNA OR RNA); SEVERE ACUTE RESPIRATORY SYNDROME CORONAVIRUS 2 (SARS-COV-2) (CORONAVIRUS DISEASE [COVID-19]), AMPLIFIED PROBE TECHNIQUE, MAKING USE OF HIGH THROUGHPUT TECHNOLOGIES AS DESCRIBED BY CMS-2020-01-R: HCPCS

## 2020-10-30 LAB
COVID ORDER STATUS COVID19: NORMAL
SARS-COV-2 RNA RESP QL NAA+PROBE: NOTDETECTED
SPECIMEN SOURCE: NORMAL

## 2020-12-16 ENCOUNTER — HOSPITAL ENCOUNTER (OUTPATIENT)
Dept: LAB | Facility: MEDICAL CENTER | Age: 65
End: 2020-12-16
Attending: INTERNAL MEDICINE
Payer: MEDICARE

## 2020-12-16 LAB
ALBUMIN SERPL BCP-MCNC: 4.5 G/DL (ref 3.2–4.9)
ALBUMIN/GLOB SERPL: 2 G/DL
ALP SERPL-CCNC: 66 U/L (ref 30–99)
ALT SERPL-CCNC: 14 U/L (ref 2–50)
ANION GAP SERPL CALC-SCNC: 8 MMOL/L (ref 7–16)
AST SERPL-CCNC: 15 U/L (ref 12–45)
BASOPHILS # BLD AUTO: 0.9 % (ref 0–1.8)
BASOPHILS # BLD: 0.05 K/UL (ref 0–0.12)
BILIRUB SERPL-MCNC: 0.4 MG/DL (ref 0.1–1.5)
BUN SERPL-MCNC: 11 MG/DL (ref 8–22)
CALCIUM SERPL-MCNC: 8.7 MG/DL (ref 8.5–10.5)
CHLORIDE SERPL-SCNC: 109 MMOL/L (ref 96–112)
CO2 SERPL-SCNC: 22 MMOL/L (ref 20–33)
CREAT SERPL-MCNC: 0.85 MG/DL (ref 0.5–1.4)
EOSINOPHIL # BLD AUTO: 0.15 K/UL (ref 0–0.51)
EOSINOPHIL NFR BLD: 2.6 % (ref 0–6.9)
ERYTHROCYTE [DISTWIDTH] IN BLOOD BY AUTOMATED COUNT: 50.6 FL (ref 35.9–50)
GLOBULIN SER CALC-MCNC: 2.2 G/DL (ref 1.9–3.5)
GLUCOSE SERPL-MCNC: 91 MG/DL (ref 65–99)
HCT VFR BLD AUTO: 39.4 % (ref 37–47)
HGB BLD-MCNC: 12.4 G/DL (ref 12–16)
IMM GRANULOCYTES # BLD AUTO: 0.01 K/UL (ref 0–0.11)
IMM GRANULOCYTES NFR BLD AUTO: 0.2 % (ref 0–0.9)
LYMPHOCYTES # BLD AUTO: 1.93 K/UL (ref 1–4.8)
LYMPHOCYTES NFR BLD: 32.9 % (ref 22–41)
MCH RBC QN AUTO: 34.6 PG (ref 27–33)
MCHC RBC AUTO-ENTMCNC: 31.5 G/DL (ref 33.6–35)
MCV RBC AUTO: 110.1 FL (ref 81.4–97.8)
MONOCYTES # BLD AUTO: 0.73 K/UL (ref 0–0.85)
MONOCYTES NFR BLD AUTO: 12.5 % (ref 0–13.4)
NEUTROPHILS # BLD AUTO: 2.99 K/UL (ref 2–7.15)
NEUTROPHILS NFR BLD: 50.9 % (ref 44–72)
NRBC # BLD AUTO: 0 K/UL
NRBC BLD-RTO: 0 /100 WBC
PLATELET # BLD AUTO: 241 K/UL (ref 164–446)
PMV BLD AUTO: 9.5 FL (ref 9–12.9)
POTASSIUM SERPL-SCNC: 3.7 MMOL/L (ref 3.6–5.5)
PROT SERPL-MCNC: 6.7 G/DL (ref 6–8.2)
RBC # BLD AUTO: 3.58 M/UL (ref 4.2–5.4)
SODIUM SERPL-SCNC: 139 MMOL/L (ref 135–145)
WBC # BLD AUTO: 5.9 K/UL (ref 4.8–10.8)

## 2020-12-16 PROCEDURE — 80053 COMPREHEN METABOLIC PANEL: CPT

## 2020-12-16 PROCEDURE — 36415 COLL VENOUS BLD VENIPUNCTURE: CPT

## 2020-12-16 PROCEDURE — 80197 ASSAY OF TACROLIMUS: CPT

## 2020-12-16 PROCEDURE — 85025 COMPLETE CBC W/AUTO DIFF WBC: CPT

## 2020-12-18 LAB — TACROLIMUS BLD-MCNC: 4.6 NG/ML

## 2021-01-26 ENCOUNTER — OFFICE VISIT (OUTPATIENT)
Dept: OPHTHALMOLOGY | Facility: MEDICAL CENTER | Age: 66
End: 2021-01-26
Payer: MEDICARE

## 2021-01-26 DIAGNOSIS — H25.11 AGE-RELATED NUCLEAR CATARACT OF RIGHT EYE: ICD-10-CM

## 2021-01-26 DIAGNOSIS — H47.10 PAPILLEDEMA: ICD-10-CM

## 2021-01-26 DIAGNOSIS — I67.1 NONRUPTURED CEREBRAL ANEURYSM: ICD-10-CM

## 2021-01-26 DIAGNOSIS — I67.1 RIGHT INTERNAL CAROTID ARTERY ANEURYSM: ICD-10-CM

## 2021-01-26 PROCEDURE — 92250 FUNDUS PHOTOGRAPHY W/I&R: CPT | Performed by: OPHTHALMOLOGY

## 2021-01-26 PROCEDURE — 99214 OFFICE O/P EST MOD 30 MIN: CPT | Mod: 25 | Performed by: OPHTHALMOLOGY

## 2021-01-26 RX ORDER — SOLIFENACIN SUCCINATE 5 MG/1
10 TABLET, FILM COATED ORAL DAILY
COMMUNITY

## 2021-01-26 ASSESSMENT — REFRACTION_WEARINGRX
SPECS_TYPE: SVL
OS_SPHERE: +1.00
OD_CYLINDER: +0.75
OS_CYLINDER: +1.50
OD_SPHERE: +2.00
OS_AXIS: 175
OD_AXIS: 154

## 2021-01-26 ASSESSMENT — TONOMETRY
OS_IOP_MMHG: 11
OD_IOP_MMHG: 13

## 2021-01-26 ASSESSMENT — REFRACTION_MANIFEST
OS_AXIS: 163
METHOD_AUTOREFRACTION: 1
OS_CYLINDER: +1.25
OD_CYLINDER: +0.75
OD_AXIS: 159
OD_SPHERE: -0.75
OS_SPHERE: -1.50

## 2021-01-26 ASSESSMENT — CUP TO DISC RATIO
OS_RATIO: 0.3
OD_RATIO: 0.3

## 2021-01-26 ASSESSMENT — CONF VISUAL FIELD
OD_NORMAL: 1
OS_NORMAL: 1

## 2021-01-26 ASSESSMENT — VISUAL ACUITY
METHOD: SNELLEN - LINEAR
OS_PH_SC: 20/20
OD_SC: 20/25-2
OS_SC: 20/25-1
OD_PH_SC: 20/20-1

## 2021-01-26 ASSESSMENT — EXTERNAL EXAM - RIGHT EYE: OD_EXAM: NORMAL

## 2021-01-26 ASSESSMENT — SLIT LAMP EXAM - LIDS
COMMENTS: NORMAL
COMMENTS: NORMAL

## 2021-01-26 ASSESSMENT — EXTERNAL EXAM - LEFT EYE: OS_EXAM: NORMAL

## 2021-01-26 ASSESSMENT — ENCOUNTER SYMPTOMS: HEADACHES: 1

## 2021-01-26 NOTE — ASSESSMENT & PLAN NOTE
7/16/2019 - In regards to her continued blurry vision in the left eye I think that it is multifactorial. Her 5-Line OCT was normal, so there does not appear to be a maculopathy. She does have some uncorrected against the rule astigmatism that may be contributing. It appears as though the astigmatism is lenticular since her K readings did not demonstrate significant corneal astigmatism. 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 Thiss may also explain the fact that she had anisometropia following the injury as well as more predisposed to earlier cataract development in that eye. She also has some PCO as well. Under binocular conditions she may have switched her dominance now to the left eye, thus leading to the complaint of no significant improvement in binocular vision following cataract extraction. Thus recommended that she continue as planned with cataract extraction of the right eye. Then under binocular conditions she might do better. 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   1/20/2020 - Got cataract extraction OD and IOL exchange OS. On schedule for YAG. Gave rx to improve acuity that can get post yag.   1/26/2021 - Post yag by Dr Bello. No distance rx needed just wearing reading glasses

## 2021-01-26 NOTE — ASSESSMENT & PLAN NOTE
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 Is currently taking Diamox 625 mg per day for al least the past 10 years. Today I see no evidence of papilledema. Her NFL thickness is normal as well as the ganglion cell, so there is also no evidence that any papilledema in the past caused permanent nerve changes. She inquired as to wether she could taper or stop the Diamox, but I discussed that my concern is that her current anti-rejection medications for her liver transplant and her blood pressure could be affected if she were to discontinue the diamox. Therefore recommended that she discuss with the transplant team. If they agree to wean, then my recommendation would be to do so slowly over at least 6 months to even a year with frequent Neuro-ophthalmological and OCT examinations to rule out the re-development of nerve swelling.   1/20/2020 - Tried taper diamox, but then got headaches back and went back to original does of 2.5 tabs per day. OCT NFL thickness today at 101 OD and 89 OS. Thus discussed since electrolytes are good, that could continue with the current does.   1/26/2021 - Still taking Diamox. Takes one am, one lunch, then 1/2 in the evening. Headaches much worse. Still no evidence of papilledema with OCT NFL thickness 88 Od and 93 OS. By description of the headache that begins when in bed, starts in region of greater occipital nerve then transmits to behind eye, more consistent with occipital neuralgia. Decompression of chiari never made the headaches significantly worse. Will therefore refer to Physiatry for occipital nerve block. Since has history of aneurysm an no follow up since 2019 will order MRI and MRA. Also refer back to Dr Pastrana in interventional neuroradiology

## 2021-01-26 NOTE — ASSESSMENT & PLAN NOTE
7/16/2019 - Aneurysm does not appear to be in location that would affect the visual pathways.   1/26/2021 - discovered aneurysm tried to fix Dr Victoriano prado then two years ago clipped. has not had follow up. Will re-refer and order MRA

## 2021-01-26 NOTE — PROGRESS NOTES
Peds/Neuro Ophthalmology:   Morteza Lewis M.D.    Date & Time note created:    1/26/2021   12:14 PM     Referring MD / APRN:  Fred Mccain M.D., No att. providers found    Patient ID:  Name:             Yane Mendoza   YOB: 1955  Age:                 65 y.o.  female   MRN:               [id number]    Chief Complaint/Reason for Visit:     Papilledema      History of Present Illness:    Yane Mendoza is a 65 y.o. female   Follow up for Papilledema with no visual changes since last year.Headache daily which have gotten worse past few months.No double vision. Headaches not going away. Has not seen neurologist for the headaches. Taking hydocodone and imetrex. Headaches start when sleeping. Starts at back of neck then radiates to behind the eyes. Sp chiari decompression in 2008/       Review of Systems:  Review of Systems   Neurological: Positive for headaches.   All other systems reviewed and are negative.      Past Medical History:   Past Medical History:   Diagnosis Date   • Anesthesia     ponv   • Cataracts, bilateral    • H/O liver transplant (HCC)    • Jaundice 1999    PBC   • Migraine headache    • Osteoporosis    • PONV (postoperative nausea and vomiting)     Sometimes a headache (migraine).   • Primary biliary cirrhosis (HCC)     s/p liver transplant 1999   • Right internal carotid artery aneurysm    • Snoring        Past Surgical History:  Past Surgical History:   Procedure Laterality Date   • RECOVERY  6/4/2012    Performed by SURGERY, IR-RECOVERY at [facility]S   • PB SUBOCCIPT DECOMP MEDULLA/SP CRD  9/4/2008    [place]   • PB TRANSPLANT LIVER,ALLOTRANSPLANT  5/30/1999    [place]   • CATARACT PHACO WITH IOL Bilateral    • EYE SURGERY Left     Cataract extraction wih IOL   • EYE SURGERY Left     LASIK       Current Outpatient Medications:  Current Outpatient Medications   Medication Sig Dispense Refill   • solifenacin (VESICARE) 5 MG tablet  Take 10 mg by mouth every day.     • acetaZOLAMIDE (DIAMOX) 250 MG Tab Take 1-2 Tabs by mouth 3 times a day. 90 Tab 12   • omeprazole (PRILOSEC) 20 MG delayed-release capsule Take 20 mg by mouth every day.     • zolpidem (AMBIEN) 10 MG Tab Take 15-20 mg by mouth at bedtime as needed for Sleep.     • acyclovir (ZOVIRAX) 800 MG Tab Take 800 mg by mouth 4 times a day.     • vitamin D (CHOLECALCIFEROL) 1000 UNIT Tab Take 1,000 Units by mouth every evening.     • Glucosamine-Chondroit-Vit C-Mn (GLUCOSAMINE 1500 COMPLEX PO) Take 1 Tab by mouth 2 Times a Day.     • Magnesium 125 MG Cap Take 1 Cap by mouth 2 Times a Day.     • MELATONIN PO Take 20 mg by mouth every bedtime.     • sucralfate (CARAFATE) 1 GM TABS Take 1 g by mouth 4 times a day.     • estradiol (VIVELLE DOT) 0.05 MG/24HR PTTW Apply 1 Patch to skin as directed 2 times a week. Use one patch two times per week.      • hydrocodone/acetaminophen (NORCO)  MG TABS Take 1 Tab by mouth every 6 hours as needed.     • Calcium Carbonate-Vitamin D (CALCIUM + D PO) Take 1 Tab by mouth 2 Times a Day.     • mycophenolate (CELLCEPT) 250 MG CAPS Take 750 mg by mouth 2 times a day. Indications: Body's Rejection of a Transplanted Organ     • tacrolimus (PROGRAF) 1 MG CAPS Take 1 mg by mouth 2 times a day. Indications: Body's Rejection of a Transplanted Organ     • progesterone (PROMETRIUM) 100 MG CAPS Take 100 mg by mouth every evening.     • sumatriptan (IMITREX) 50 MG TABS Take 100 mg by mouth Once PRN.     • artificial tears (EYE LUBRICANT) Ointment ophth ointment Place 1 Application in both eyes every 8 hours.     • terbinafine (LAMISIL) 250 MG Tab Take 1 Tab by mouth See Admin Instructions. TAKE 1 TABLET BY MOUTH DAILY x 7 days of month (1st-7th)  2   • pantoprazole (PROTONIX) 40 MG Tablet Delayed Response Take 40 mg by mouth every day.     • clopidogrel (PLAVIX) 75 MG Tab Take 75 mg by mouth every day.     • aspirin 81 MG tablet Take 81 mg by mouth every day.        No current facility-administered medications for this visit.        Allergies:  Allergies   Allergen Reactions   • Codeine Vomiting       Family History:  Family History   Problem Relation Age of Onset   • Cancer Mother    • Heart Disease Father    • Cancer Father    • Heart Disease Brother    • Cancer Brother        Social History:  Social History     Socioeconomic History   • Marital status:      Spouse name: Not on file   • Number of children: Not on file   • Years of education: Not on file   • Highest education level: Not on file   Occupational History   • Not on file   Social Needs   • Financial resource strain: Not on file   • Food insecurity     Worry: Not on file     Inability: Not on file   • Transportation needs     Medical: Not on file     Non-medical: Not on file   Tobacco Use   • Smoking status: Never Smoker   • Smokeless tobacco: Never Used   Substance and Sexual Activity   • Alcohol use: No   • Drug use: No   • Sexual activity: Not on file   Lifestyle   • Physical activity     Days per week: Not on file     Minutes per session: Not on file   • Stress: Not on file   Relationships   • Social connections     Talks on phone: Not on file     Gets together: Not on file     Attends Yazidism service: Not on file     Active member of club or organization: Not on file     Attends meetings of clubs or organizations: Not on file     Relationship status: Not on file   • Intimate partner violence     Fear of current or ex partner: Not on file     Emotionally abused: Not on file     Physically abused: Not on file     Forced sexual activity: Not on file   Other Topics Concern   • Not on file   Social History Narrative    66 yo female retired.          Physical Exam:  Physical Exam    Oriented x 3  Weight/BMI: There is no height or weight on file to calculate BMI.  There were no vitals taken for this visit.    Base Eye Exam     Visual Acuity (Snellen - Linear)       Right Left    Dist sc 20/25-2 20/25-1     Dist ph sc 20/20-1 20/20          Tonometry (i care, 10:57 AM)       Right Left    Pressure 13 11          Pupils       Pupils    Right PERRL    Left PERRL          Visual Fields       Right Left     Full Full          Extraocular Movement       Right Left     Full, Ortho Full, Ortho          Neuro/Psych     Oriented x3: Yes    Mood/Affect: Normal          Dilation     Both eyes: Tropicamide (MYDRIACYL) 1% ophthalmic solution, Phenylephrine (NEOSYNEPHRINE) ophthalmic solution 2.5% @ 12:11 PM            Additional Tests     Color       Right Left    Ishihara 9/9 9/9          Stereo     Fly: +    Animals: 3/3    Circles: 7/9            Slit Lamp and Fundus Exam     External Exam       Right Left    External Normal Normal          Slit Lamp Exam       Right Left    Lids/Lashes Normal Normal    Conjunctiva/Sclera White and quiet White and quiet    Cornea Clear Clear    Anterior Chamber Deep and quiet Deep and quiet    Iris Round and reactive Round and reactive    Lens Posterior chamber intraocular lens, Posterior capsular opacification Posterior chamber intraocular lens, Posterior capsular opacification    Vitreous Normal Normal          Fundus Exam       Right Left    Disc Normal, ? Old [place] Normal, ? old high water livia    C/D Ratio 0.3 0.3    Macula Normal Normal    Vessels Normal Normal    Periphery Normal Normal            Refraction     Wearing Rx       Sphere Cylinder Axis    Right +2.00 +0.75 154    Left +1.00 +1.50 175    Age: 1yr    Type: SVL          Manifest Refraction (Auto)       Sphere Cylinder Axis    Right -0.75 +0.75 159    Left -1.50 +1.25 163                Pertinent Lab/Test/Imaging Review:  additional 30 minutes spent reviewing MRI imagos as well as notes from interventional neuroradiology    Assessment and Plan:     Age-related nuclear cataract of right eye  7/16/2019 - In regards to her continued blurry vision in the left eye I think that it is multifactorial. Her 5-Line OCT was  normal, so there does not appear to be a maculopathy. She does have some uncorrected against the rule astigmatism that may be contributing. It appears as though the astigmatism is lenticular since her K readings did not demonstrate significant corneal astigmatism. 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 Thiss may also explain the fact that she had anisometropia following the injury as well as more predisposed to earlier cataract development in that eye. She also has some PCO as well. Under binocular conditions she may have switched her dominance now to the left eye, thus leading to the complaint of no significant improvement in binocular vision following cataract extraction. Thus recommended that she continue as planned with cataract extraction of the right eye. Then under binocular conditions she might do better. 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   1/20/2020 - Got cataract extraction OD and IOL exchange OS. On schedule for YAG. Gave rx to improve acuity that can get post yag.   1/26/2021 - Post yag by Dr Bello. 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 Is currently taking Diamox 625 mg per day for al least the past 10 years. Today I see no evidence of papilledema. Her NFL thickness is normal as well as the ganglion cell, so there is also no evidence that any papilledema in the past caused permanent nerve changes. She inquired as to wether she could taper or stop the Diamox, but I discussed that my concern is that her current anti-rejection  medications for her liver transplant and her blood pressure could be affected if she were to discontinue the diamox. Therefore recommended that she discuss with the transplant team. If they agree to wean, then my recommendation would be to do so slowly over at least 6 months to even a year with frequent Neuro-ophthalmological and OCT examinations to rule out the re-development of nerve swelling.   1/20/2020 - Tried taper diamox, but then got headaches back and went back to original does of 2.5 tabs per day. OCT NFL thickness today at 101 OD and 89 OS. Thus discussed since electrolytes are good, that could continue with the current does.   1/26/2021 - Still taking Diamox. Takes one am, one lunch, then 1/2 in the evening. Headaches much worse. Still no evidence of papilledema with OCT NFL thickness 88 Od and 93 OS. By description of the headache that begins when in bed, starts in region of greater occipital nerve then transmits to behind eye, more consistent with occipital neuralgia. Decompression of chiari never made the headaches significantly worse. Will therefore refer to Physiatry for occipital nerve block. Since has history of aneurysm an no follow up since 2019 will order MRI and MRA. Also refer back to Dr Pastrana in interventional neuroradiology    Right internal carotid artery aneurysm  7/16/2019 - Aneurysm does not appear to be in location that would affect the visual pathways.   1/26/2021 - discovered aneurysm tried to fix Dr Pastrana coil then two years ago clipped. has not had follow up. Will re-refer and order MRA         Morteza Lewis M.D.

## 2021-03-03 DIAGNOSIS — Z23 NEED FOR VACCINATION: ICD-10-CM

## 2021-03-29 ENCOUNTER — HOSPITAL ENCOUNTER (OUTPATIENT)
Dept: RADIOLOGY | Facility: MEDICAL CENTER | Age: 66
End: 2021-03-29
Attending: OPHTHALMOLOGY
Payer: MEDICARE

## 2021-03-29 DIAGNOSIS — H47.10 PAPILLEDEMA: ICD-10-CM

## 2021-03-29 DIAGNOSIS — I67.1 NONRUPTURED CEREBRAL ANEURYSM: ICD-10-CM

## 2021-03-29 DIAGNOSIS — I67.1 RIGHT INTERNAL CAROTID ARTERY ANEURYSM: ICD-10-CM

## 2021-03-29 PROCEDURE — 70544 MR ANGIOGRAPHY HEAD W/O DYE: CPT | Mod: ME

## 2021-03-29 PROCEDURE — 70551 MRI BRAIN STEM W/O DYE: CPT | Mod: MG

## 2021-03-30 DIAGNOSIS — H47.10 PAPILLEDEMA: ICD-10-CM

## 2021-03-30 NOTE — ASSESSMENT & PLAN NOTE
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 Is currently taking Diamox 625 mg per day for al least the past 10 years. Today I see no evidence of papilledema. Her NFL thickness is normal as well as the ganglion cell, so there is also no evidence that any papilledema in the past caused permanent nerve changes. She inquired as to wether she could taper or stop the Diamox, but I discussed that my concern is that her current anti-rejection medications for her liver transplant and her blood pressure could be affected if she were to discontinue the diamox. Therefore recommended that she discuss with the transplant team. If they agree to wean, then my recommendation would be to do so slowly over at least 6 months to even a year with frequent Neuro-ophthalmological and OCT examinations to rule out the re-development of nerve swelling.   1/20/2020 - Tried taper diamox, but then got headaches back and went back to original does of 2.5 tabs per day. OCT NFL thickness today at 101 OD and 89 OS. Thus discussed since electrolytes are good, that could continue with the current does.   1/26/2021 - Still taking Diamox. Takes one am, one lunch, then 1/2 in the evening. Headaches much worse. Still no evidence of papilledema with OCT NFL thickness 88 Od and 93 OS. By description of the headache that begins when in bed, starts in region of greater occipital nerve then transmits to behind eye, more consistent with occipital neuralgia. Decompression of chiari never made the headaches significantly worse. Will therefore refer to Physiatry for occipital nerve block. Since has history of aneurysm an no follow up since 2019 will order MRI and MRA. Also refer back to Dr Pastrana in interventional neuroradiology  3/30/2021 - MRI and MRA no recurrent aneurysm. States still having headaches. Went to physiatry and got injection, but apparently did not  help. Will therefore refer to headache clinic.

## 2021-03-30 NOTE — ASSESSMENT & PLAN NOTE
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 Is currently taking Diamox 625 mg per day for al least the past 10 years. Today I see no evidence of papilledema. Her NFL thickness is normal as well as the ganglion cell, so there is also no evidence that any papilledema in the past caused permanent nerve changes. She inquired as to wether she could taper or stop the Diamox, but I discussed that my concern is that her current anti-rejection medications for her liver transplant and her blood pressure could be affected if she were to discontinue the diamox. Therefore recommended that she discuss with the transplant team. If they agree to wean, then my recommendation would be to do so slowly over at least 6 months to even a year with frequent Neuro-ophthalmological and OCT examinations to rule out the re-development of nerve swelling.   1/20/2020 - Tried taper diamox, but then got headaches back and went back to original does of 2.5 tabs per day. OCT NFL thickness today at 101 OD and 89 OS. Thus discussed since electrolytes are good, that could continue with the current does.   1/26/2021 - Still taking Diamox. Takes one am, one lunch, then 1/2 in the evening. Headaches much worse. Still no evidence of papilledema with OCT NFL thickness 88 Od and 93 OS. By description of the headache that begins when in bed, starts in region of greater occipital nerve then transmits to behind eye, more consistent with occipital neuralgia. Decompression of chiari never made the headaches significantly worse. Will therefore refer to Physiatry for occipital nerve block. Since has history of aneurysm an no follow up since 2019 will order MRI and MRA. Also refer back to Dr Pastrana in interventional neuroradiology  3/30/2021 - MRI scan No acute abnormality. Chiari I malformation with changes secondary to the previous suboccipital craniotomy. There  are changes secondary to the previous endovascular repair of right internal carotid aneurysm. There is no residual aneurysm. The previously seen residual aneurysm at the neck is not seen. MRA no recurrent or residual aneurysm

## 2021-05-05 ENCOUNTER — HOSPITAL ENCOUNTER (OUTPATIENT)
Dept: RADIOLOGY | Facility: MEDICAL CENTER | Age: 66
End: 2021-05-05
Attending: SPECIALIST
Payer: MEDICARE

## 2021-05-05 DIAGNOSIS — N63.10 MASS OF RIGHT BREAST: ICD-10-CM

## 2021-05-05 DIAGNOSIS — R92.30 BREAST DENSITY: ICD-10-CM

## 2021-05-05 DIAGNOSIS — N63.10 BILATERAL BREAST LUMP: ICD-10-CM

## 2021-05-05 DIAGNOSIS — N63.20 BILATERAL BREAST LUMP: ICD-10-CM

## 2021-05-05 PROCEDURE — 76642 ULTRASOUND BREAST LIMITED: CPT | Mod: RT

## 2021-05-05 PROCEDURE — G0279 TOMOSYNTHESIS, MAMMO: HCPCS

## 2021-12-01 ENCOUNTER — HOSPITAL ENCOUNTER (OUTPATIENT)
Dept: LAB | Facility: MEDICAL CENTER | Age: 66
End: 2021-12-01
Attending: FAMILY MEDICINE
Payer: MEDICARE

## 2021-12-01 ENCOUNTER — HOSPITAL ENCOUNTER (OUTPATIENT)
Dept: LAB | Facility: MEDICAL CENTER | Age: 66
End: 2021-12-01
Attending: INTERNAL MEDICINE
Payer: MEDICARE

## 2021-12-01 LAB
ALBUMIN SERPL BCP-MCNC: 4.3 G/DL (ref 3.2–4.9)
ALBUMIN/GLOB SERPL: 1.7 G/DL
ALP SERPL-CCNC: 68 U/L (ref 30–99)
ALT SERPL-CCNC: 14 U/L (ref 2–50)
ANION GAP SERPL CALC-SCNC: 8 MMOL/L (ref 7–16)
AST SERPL-CCNC: 15 U/L (ref 12–45)
BASOPHILS # BLD AUTO: 1.1 % (ref 0–1.8)
BASOPHILS # BLD: 0.06 K/UL (ref 0–0.12)
BILIRUB SERPL-MCNC: 0.3 MG/DL (ref 0.1–1.5)
BUN SERPL-MCNC: 10 MG/DL (ref 8–22)
CALCIUM SERPL-MCNC: 8.9 MG/DL (ref 8.5–10.5)
CHLORIDE SERPL-SCNC: 111 MMOL/L (ref 96–112)
CO2 SERPL-SCNC: 22 MMOL/L (ref 20–33)
CREAT SERPL-MCNC: 0.87 MG/DL (ref 0.5–1.4)
EOSINOPHIL # BLD AUTO: 0.14 K/UL (ref 0–0.51)
EOSINOPHIL NFR BLD: 2.5 % (ref 0–6.9)
ERYTHROCYTE [DISTWIDTH] IN BLOOD BY AUTOMATED COUNT: 55 FL (ref 35.9–50)
GLOBULIN SER CALC-MCNC: 2.5 G/DL (ref 1.9–3.5)
GLUCOSE SERPL-MCNC: 111 MG/DL (ref 65–99)
HCT VFR BLD AUTO: 37.8 % (ref 37–47)
HGB BLD-MCNC: 11.9 G/DL (ref 12–16)
IMM GRANULOCYTES # BLD AUTO: 0.01 K/UL (ref 0–0.11)
IMM GRANULOCYTES NFR BLD AUTO: 0.2 % (ref 0–0.9)
LYMPHOCYTES # BLD AUTO: 1.64 K/UL (ref 1–4.8)
LYMPHOCYTES NFR BLD: 29.8 % (ref 22–41)
MCH RBC QN AUTO: 34.8 PG (ref 27–33)
MCHC RBC AUTO-ENTMCNC: 31.5 G/DL (ref 33.6–35)
MCV RBC AUTO: 110.5 FL (ref 81.4–97.8)
MONOCYTES # BLD AUTO: 0.56 K/UL (ref 0–0.85)
MONOCYTES NFR BLD AUTO: 10.2 % (ref 0–13.4)
NEUTROPHILS # BLD AUTO: 3.1 K/UL (ref 2–7.15)
NEUTROPHILS NFR BLD: 56.2 % (ref 44–72)
NRBC # BLD AUTO: 0 K/UL
NRBC BLD-RTO: 0 /100 WBC
PLATELET # BLD AUTO: 275 K/UL (ref 164–446)
PMV BLD AUTO: 9.9 FL (ref 9–12.9)
POTASSIUM SERPL-SCNC: 3.8 MMOL/L (ref 3.6–5.5)
PROT SERPL-MCNC: 6.8 G/DL (ref 6–8.2)
RBC # BLD AUTO: 3.42 M/UL (ref 4.2–5.4)
SODIUM SERPL-SCNC: 141 MMOL/L (ref 135–145)
WBC # BLD AUTO: 5.5 K/UL (ref 4.8–10.8)

## 2021-12-01 PROCEDURE — 80197 ASSAY OF TACROLIMUS: CPT

## 2021-12-01 PROCEDURE — 86769 SARS-COV-2 COVID-19 ANTIBODY: CPT

## 2021-12-01 PROCEDURE — 36415 COLL VENOUS BLD VENIPUNCTURE: CPT

## 2021-12-01 PROCEDURE — 80053 COMPREHEN METABOLIC PANEL: CPT

## 2021-12-01 PROCEDURE — 85025 COMPLETE CBC W/AUTO DIFF WBC: CPT

## 2021-12-04 LAB
SARS-COV-2 IGG SERPL IA-ACNC: 21.91 IV
SARS-COV-2 IGG SERPL QL IA: POSITIVE
TACROLIMUS BLD-MCNC: 2.9 NG/ML

## 2022-01-25 ENCOUNTER — OFFICE VISIT (OUTPATIENT)
Dept: OPHTHALMOLOGY | Facility: MEDICAL CENTER | Age: 67
End: 2022-01-25
Payer: MEDICARE

## 2022-01-25 DIAGNOSIS — H47.10 PAPILLEDEMA: ICD-10-CM

## 2022-01-25 DIAGNOSIS — I67.1 RIGHT INTERNAL CAROTID ARTERY ANEURYSM: ICD-10-CM

## 2022-01-25 PROCEDURE — 99214 OFFICE O/P EST MOD 30 MIN: CPT | Mod: 25 | Performed by: OPHTHALMOLOGY

## 2022-01-25 PROCEDURE — 92250 FUNDUS PHOTOGRAPHY W/I&R: CPT | Performed by: OPHTHALMOLOGY

## 2022-01-25 RX ORDER — SULFASALAZINE 500 MG/1
500 TABLET ORAL 4 TIMES DAILY
COMMUNITY

## 2022-01-25 RX ORDER — PHENOL 1.4 %
20 AEROSOL, SPRAY (ML) MUCOUS MEMBRANE
COMMUNITY

## 2022-01-25 RX ORDER — ACETAZOLAMIDE 250 MG/1
250-500 TABLET ORAL 3 TIMES DAILY
Qty: 90 TABLET | Refills: 12 | Status: SHIPPED | OUTPATIENT
Start: 2022-01-25

## 2022-01-25 ASSESSMENT — REFRACTION_MANIFEST
OD_CYLINDER: +0.25
OD_SPHERE: -1.00
OD_AXIS: 171
OS_SPHERE: -1.75
OS_AXIS: 164
OS_CYLINDER: +1.50
METHOD_AUTOREFRACTION: 1

## 2022-01-25 ASSESSMENT — TONOMETRY
IOP_METHOD: I-CARE
OD_IOP_MMHG: 10
OS_IOP_MMHG: 10

## 2022-01-25 ASSESSMENT — VISUAL ACUITY
METHOD: SNELLEN - LINEAR
OD_SC+: -2
OD_SC: 20/20
OS_SC: 20/25

## 2022-01-25 ASSESSMENT — SLIT LAMP EXAM - LIDS
COMMENTS: NORMAL
COMMENTS: NORMAL

## 2022-01-25 ASSESSMENT — REFRACTION_WEARINGRX
OD_AXIS: 153
OS_AXIS: 171
OD_SPHERE: +2.00
OD_CYLINDER: +0.75
OS_CYLINDER: +1.50
OS_SPHERE: +1.00
SPECS_TYPE: SVL READING

## 2022-01-25 ASSESSMENT — CONF VISUAL FIELD
OD_NORMAL: 1
OS_NORMAL: 1

## 2022-01-25 ASSESSMENT — EXTERNAL EXAM - RIGHT EYE: OD_EXAM: NORMAL

## 2022-01-25 ASSESSMENT — ENCOUNTER SYMPTOMS: HEADACHES: 1

## 2022-01-25 ASSESSMENT — EXTERNAL EXAM - LEFT EYE: OS_EXAM: NORMAL

## 2022-01-25 ASSESSMENT — CUP TO DISC RATIO
OD_RATIO: 0.3
OS_RATIO: 0.3

## 2022-01-25 NOTE — PROGRESS NOTES
Peds/Neuro Ophthalmology:   Morteza Lewis M.D.    Date & Time note created:    1/25/2022   11:23 AM     Referring MD / APRN:  Fred Mccain M.D., No att. providers found    Patient ID:  Name:             Yane Mendoza   YOB: 1955  Age:                 66 y.o.  female   MRN:               [id number]    Chief Complaint/Reason for Visit:     Papilledema (1 year F/u)      History of Present Illness:    Yane Mendoza is a 66 y.o. female   Pt is here for 1 year F/u for Papilledema. Pt states vision is stable since last year no changes. Pt is still using Diamox. Pt has not see Dr Pastrana. Pt denies pain or discomfor in both eyes. Pt states she has headaches everyday.        Review of Systems:  Review of Systems   Eyes:        Papilledema  Age-related nuclear cataract of right eye   Neurological: Positive for headaches.   All other systems reviewed and are negative.      Past Medical History:   Past Medical History:   Diagnosis Date   • Anesthesia     ponv   • Cataracts, bilateral    • H/O liver transplant (HCC)    • Jaundice 1999    PBC   • Migraine headache    • Osteoporosis    • PONV (postoperative nausea and vomiting)     Sometimes a headache (migraine).   • Primary biliary cirrhosis (HCC)     s/p liver transplant 1999   • Right internal carotid artery aneurysm    • Snoring        Past Surgical History:  Past Surgical History:   Procedure Laterality Date   • RECOVERY  6/4/2012    Performed by SURGERY, IR-RECOVERY at SURGERY [facility] ORS   • TN SUBOCCIPT DECOMP MEDULLA/SP CRD  9/4/2008    [place], [place]   • TN TRANSPLANT LIVER,ALLOTRANSPLANT  5/30/1999    [place], [place]   • CATARACT PHACO WITH IOL Bilateral    • EYE SURGERY Left     Cataract extraction wih IOL   • EYE SURGERY Left     LASIK       Current Outpatient Medications:  Current Outpatient Medications   Medication Sig Dispense Refill   • Magnesium Bisglycinate (MAG GLYCINATE PO) Take  by mouth.     • Melatonin 10 MG Tab  Take 20 mg by mouth.     • sulfaSALAzine (AZULFIDINE) 500 MG Tab Take 500 mg by mouth 4 times a day.     • acetaZOLAMIDE (DIAMOX) 250 MG Tab Take 1-2 Tablets by mouth 3 times a day. 90 Tablet 12   • solifenacin (VESICARE) 5 MG tablet Take 10 mg by mouth every day.     • omeprazole (PRILOSEC) 20 MG delayed-release capsule Take 20 mg by mouth every day.     • artificial tears (EYE LUBRICANT) Ointment ophth ointment Place 1 Application in both eyes every 8 hours.     • zolpidem (AMBIEN) 10 MG Tab Take 15-20 mg by mouth at bedtime as needed for Sleep.     • acyclovir (ZOVIRAX) 800 MG Tab Take 800 mg by mouth 4 times a day.     • vitamin D (CHOLECALCIFEROL) 1000 UNIT Tab Take 1,000 Units by mouth every evening.     • Glucosamine-Chondroit-Vit C-Mn (GLUCOSAMINE 1500 COMPLEX PO) Take 1 Tab by mouth 2 Times a Day.     • Magnesium 125 MG Cap Take 1 Cap by mouth 2 Times a Day.     • sucralfate (CARAFATE) 1 GM TABS Take 1 g by mouth 4 times a day.     • estradiol (VIVELLE DOT) 0.05 MG/24HR PTTW Apply 1 Patch to skin as directed 2 times a week. Use one patch two times per week.      • hydrocodone/acetaminophen (NORCO)  MG TABS Take 1 Tab by mouth every 6 hours as needed.     • Calcium Carbonate-Vitamin D (CALCIUM + D PO) Take 1 Tab by mouth 2 Times a Day.     • mycophenolate (CELLCEPT) 250 MG CAPS Take 750 mg by mouth 2 times a day. Indications: Body's Rejection of a Transplanted Organ     • tacrolimus (PROGRAF) 1 MG CAPS Take 1 mg by mouth 2 times a day. Indications: Body's Rejection of a Transplanted Organ     • progesterone (PROMETRIUM) 100 MG CAPS Take 100 mg by mouth every evening.     • sumatriptan (IMITREX) 50 MG TABS Take 100 mg by mouth Once PRN.     • terbinafine (LAMISIL) 250 MG Tab Take 1 Tab by mouth See Admin Instructions. TAKE 1 TABLET BY MOUTH DAILY x 7 days of month (1st-7th) (Patient not taking: Reported on 1/25/2022)  2   • pantoprazole (PROTONIX) 40 MG Tablet Delayed Response Take 40 mg by mouth every  day. (Patient not taking: Reported on 1/25/2022)     • clopidogrel (PLAVIX) 75 MG Tab Take 75 mg by mouth every day. (Patient not taking: Reported on 1/25/2022)     • MELATONIN PO Take 20 mg by mouth every bedtime.     • aspirin 81 MG tablet Take 81 mg by mouth every day. (Patient not taking: Reported on 1/25/2022)       No current facility-administered medications for this visit.       Allergies:  Allergies   Allergen Reactions   • Codeine Vomiting       Family History:  Family History   Problem Relation Age of Onset   • Cancer Mother    • Heart Disease Father    • Cancer Father    • Heart Disease Brother    • Cancer Brother        Social History:  Social History     Socioeconomic History   • Marital status:      Spouse name: Not on file   • Number of children: Not on file   • Years of education: Not on file   • Highest education level: Not on file   Occupational History   • Occupation: retired   Tobacco Use   • Smoking status: Never Smoker   • Smokeless tobacco: Never Used   Vaping Use   • Vaping Use: Never used   Substance and Sexual Activity   • Alcohol use: No   • Drug use: No   • Sexual activity: Not on file   Other Topics Concern   • Not on file   Social History Narrative    67 yo female retired.     Social Determinants of Health     Financial Resource Strain:    • Difficulty of Paying Living Expenses: Not on file   Food Insecurity:    • Worried About Running Out of Food in the Last Year: Not on file   • Ran Out of Food in the Last Year: Not on file   Transportation Needs:    • Lack of Transportation (Medical): Not on file   • Lack of Transportation (Non-Medical): Not on file   Physical Activity:    • Days of Exercise per Week: Not on file   • Minutes of Exercise per Session: Not on file   Stress:    • Feeling of Stress : Not on file   Social Connections:    • Frequency of Communication with Friends and Family: Not on file   • Frequency of Social Gatherings with Friends and Family: Not on file   •  Attends Spiritism Services: Not on file   • Active Member of Clubs or Organizations: Not on file   • Attends Club or Organization Meetings: Not on file   • Marital Status: Not on file   Intimate Partner Violence:    • Fear of Current or Ex-Partner: Not on file   • Emotionally Abused: Not on file   • Physically Abused: Not on file   • Sexually Abused: Not on file   Housing Stability:    • Unable to Pay for Housing in the Last Year: Not on file   • Number of Places Lived in the Last Year: Not on file   • Unstable Housing in the Last Year: Not on file          Physical Exam:  Physical Exam    Oriented x 3  Weight/BMI: There is no height or weight on file to calculate BMI.  There were no vitals taken for this visit.    Base Eye Exam     Visual Acuity (Snellen - Linear)       Right Left    Dist sc 20/20 -2 20/25    Dist ph sc NI NI          Tonometry (I-care, 10:41 AM)       Right Left    Pressure 10 10          Pupils       Pupils    Right PERRL    Left PERRL          Visual Fields       Right Left     Full Full          Extraocular Movement       Right Left     Full, Ortho Full, Ortho          Neuro/Psych     Oriented x3: Yes    Mood/Affect: Normal          Dilation     Both eyes: able to view without dilation @ 11:21 AM            Additional Tests     Color       Right Left    Ishihara 9/9 9/9            Slit Lamp and Fundus Exam     External Exam       Right Left    External Normal Normal          Slit Lamp Exam       Right Left    Lids/Lashes Normal Normal    Conjunctiva/Sclera White and quiet White and quiet    Cornea Clear Clear    Anterior Chamber Deep and quiet Deep and quiet    Iris Round and reactive Round and reactive    Lens Posterior chamber intraocular lens, Posterior capsular opacification Posterior chamber intraocular lens, Posterior capsular opacification    Vitreous Normal Normal          Fundus Exam       Right Left    Disc Normal, ? [place] Normal, ? 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 Is currently taking Diamox 625 mg per day for al least the past 10 years. Today I see no evidence of papilledema. Her NFL thickness is normal as well as the ganglion cell, so there is also no evidence that any papilledema in the past caused permanent nerve changes. She inquired as to wether she could taper or stop the Diamox, but I discussed that my concern is that her current anti-rejection medications for her liver transplant and her blood pressure could be affected if she were to discontinue the diamox. Therefore recommended that she discuss with the transplant team. If they agree to wean, then my recommendation would be to do so slowly over at least 6 months to even a year with frequent Neuro-ophthalmological and OCT examinations to rule out the re-development of nerve swelling.   1/20/2020 - Tried taper diamox, but then got headaches back and went back to original does of 2.5 tabs per day. OCT NFL thickness today at 101 OD and 89 OS. Thus discussed since electrolytes are good, that could continue with the current does.   1/26/2021 - Still taking Diamox. Takes one am, one lunch, then 1/2 in the evening. Headaches much worse. Still no evidence of papilledema with OCT NFL thickness 88 Od and 93 OS. By description of the headache that begins when in bed, starts in region of greater occipital nerve then transmits to behind eye, more consistent  with occipital neuralgia. Decompression of chiari never made the headaches significantly worse. Will therefore refer to Physiatry for occipital nerve block. Since has history of aneurysm an no follow up since 2019 will order MRI and MRA. Also refer back to Dr Pastrana in interventional neuroradiology  3/30/2021 - MRI and MRA no recurrent aneurysm. States still having headaches. Went to physiatry and got injection, but apparently did not help. Will therefore refer to headache clinic.   1/25/2022 - MRI 3/21 - No acute abnormality, Chiari I malformation with changes secondary to the previous suboccipital craniotomy, There are changes secondary to the previous endovascular repair of right internal carotid aneurysm. There is no residual aneurysm. The previously seen residual aneurysm at the neck is not seen.Had been seen for occipital nerve block, but no improvement in headache. OFT NFL thickness stable at 95 OD and 92 OS without overt disc swelling. Tried decreasing diamox in the past, but headaches increased so restarted the prior dose. Therefore continue 625 per day divided.     Right internal carotid artery aneurysm  7/16/2019 - Aneurysm does not appear to be in location that would affect the visual pathways.   1/26/2021 - discovered aneurysm tried to fix Dr Pastrana coil then two years ago clipped. has not had follow up. Will re-refer and order MRA   1/25/2022 - MRA 3/21 no recurrent aneurysm        Morteza Lewis M.D.

## 2022-01-25 NOTE — ASSESSMENT & PLAN NOTE
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 Is currently taking Diamox 625 mg per day for al least the past 10 years. Today I see no evidence of papilledema. Her NFL thickness is normal as well as the ganglion cell, so there is also no evidence that any papilledema in the past caused permanent nerve changes. She inquired as to wether she could taper or stop the Diamox, but I discussed that my concern is that her current anti-rejection medications for her liver transplant and her blood pressure could be affected if she were to discontinue the diamox. Therefore recommended that she discuss with the transplant team. If they agree to wean, then my recommendation would be to do so slowly over at least 6 months to even a year with frequent Neuro-ophthalmological and OCT examinations to rule out the re-development of nerve swelling.   1/20/2020 - Tried taper diamox, but then got headaches back and went back to original does of 2.5 tabs per day. OCT NFL thickness today at 101 OD and 89 OS. Thus discussed since electrolytes are good, that could continue with the current does.   1/26/2021 - Still taking Diamox. Takes one am, one lunch, then 1/2 in the evening. Headaches much worse. Still no evidence of papilledema with OCT NFL thickness 88 Od and 93 OS. By description of the headache that begins when in bed, starts in region of greater occipital nerve then transmits to behind eye, more consistent with occipital neuralgia. Decompression of chiari never made the headaches significantly worse. Will therefore refer to Physiatry for occipital nerve block. Since has history of aneurysm an no follow up since 2019 will order MRI and MRA. Also refer back to Dr Pastrana in interventional neuroradiology  3/30/2021 - MRI and MRA no recurrent aneurysm. States still having headaches. Went to physiatry and got injection, but apparently did not  help. Will therefore refer to headache clinic.   1/25/2022 - MRI 3/21 - No acute abnormality, Chiari I malformation with changes secondary to the previous suboccipital craniotomy, There are changes secondary to the previous endovascular repair of right internal carotid aneurysm. There is no residual aneurysm. The previously seen residual aneurysm at the neck is not seen.Had been seen for occipital nerve block, but no improvement in headache. OFT NFL thickness stable at 95 OD and 92 OS without overt disc swelling. Tried decreasing diamox in the past, but headaches increased so restarted the prior dose. Therefore continue 625 per day divided.

## 2022-01-25 NOTE — ASSESSMENT & PLAN NOTE
7/16/2019 - Aneurysm does not appear to be in location that would affect the visual pathways.   1/26/2021 - discovered aneurysm tried to fix Dr Victoriano prado then two years ago clipped. has not had follow up. Will re-refer and order MRA   1/25/2022 - MRA 3/21 no recurrent aneurysm

## 2022-04-11 ENCOUNTER — DOCUMENTATION (OUTPATIENT)
Dept: OPHTHALMOLOGY | Facility: MEDICAL CENTER | Age: 67
End: 2022-04-11
Payer: MEDICARE

## 2022-04-11 ASSESSMENT — REFRACTION_WEARINGRX
OS_ADD: +3.00
OS_AXIS: 170
OD_CYLINDER: +0.75
OD_ADD: +3.00
OD_AXIS: 155
OD_SPHERE: -1.00
OS_CYLINDER: +1.50
OS_SPHERE: -2.00

## 2022-06-10 ENCOUNTER — HOSPITAL ENCOUNTER (OUTPATIENT)
Dept: RADIOLOGY | Facility: MEDICAL CENTER | Age: 67
End: 2022-06-10
Attending: FAMILY MEDICINE
Payer: COMMERCIAL

## 2022-06-10 ENCOUNTER — HOSPITAL ENCOUNTER (OUTPATIENT)
Dept: RADIOLOGY | Facility: MEDICAL CENTER | Age: 67
End: 2022-06-10
Attending: FAMILY MEDICINE
Payer: MEDICARE

## 2022-06-10 DIAGNOSIS — M81.0 SENILE OSTEOPOROSIS: ICD-10-CM

## 2022-06-10 DIAGNOSIS — E78.00 PURE HYPERCHOLESTEROLEMIA: ICD-10-CM

## 2022-06-10 PROCEDURE — 4410556 CT-CARDIAC SCORING (SELF PAY ONLY)

## 2022-06-10 PROCEDURE — 77080 DXA BONE DENSITY AXIAL: CPT

## 2022-11-09 ENCOUNTER — PATIENT MESSAGE (OUTPATIENT)
Dept: HEALTH INFORMATION MANAGEMENT | Facility: OTHER | Age: 67
End: 2022-11-09